# Patient Record
Sex: MALE | Race: WHITE | Employment: OTHER | URBAN - METROPOLITAN AREA
[De-identification: names, ages, dates, MRNs, and addresses within clinical notes are randomized per-mention and may not be internally consistent; named-entity substitution may affect disease eponyms.]

---

## 2017-03-19 ENCOUNTER — HOSPITAL ENCOUNTER (EMERGENCY)
Age: 42
Discharge: HOME | End: 2017-03-19
Attending: FAMILY MEDICINE

## 2017-03-19 VITALS
HEIGHT: 74 IN | HEART RATE: 76 BPM | BODY MASS INDEX: 39.78 KG/M2 | DIASTOLIC BLOOD PRESSURE: 100 MMHG | OXYGEN SATURATION: 99 % | SYSTOLIC BLOOD PRESSURE: 155 MMHG | RESPIRATION RATE: 18 BRPM | WEIGHT: 310 LBS | TEMPERATURE: 97.7 F

## 2017-03-19 DIAGNOSIS — S61.209A AVULSION OF SKIN OF FINGER, INITIAL ENCOUNTER: Primary | ICD-10-CM

## 2017-03-19 RX ORDER — SERTRALINE HYDROCHLORIDE 50 MG/1
TABLET, FILM COATED ORAL DAILY
COMMUNITY
End: 2017-12-21

## 2017-03-19 RX ORDER — HYDROCODONE BITARTRATE AND ACETAMINOPHEN 5; 325 MG/1; MG/1
1 TABLET ORAL
Qty: 10 TAB | Refills: 0 | Status: SHIPPED | OUTPATIENT
Start: 2017-03-19 | End: 2017-12-21

## 2017-03-19 NOTE — UC PROVIDER NOTE
HPI Comments: 44 yo male presents today after sustaining a skin avulsion to his left index finger with a lluvia today. Tetanus is not current. Patient is right handed. Patient is a 43 y.o. male presenting with wound check. The history is provided by the patient. No  was used. Wound Check    This is a new problem. The current episode started less than 1 hour ago. The problem occurs constantly. The problem has not changed since onset. Pain location: Left index finger. The quality of the pain is described as sharp. The pain is at a severity of 4/10. The pain is mild. Pertinent negatives include no numbness. Past Medical History:   Diagnosis Date    Cardiomyopathy [facility])         History reviewed. No pertinent surgical history. History reviewed. No pertinent family history. Social History     Social History    Marital status:      Spouse name: N/A    Number of children: N/A    Years of education: N/A     Occupational History    Not on file. Social History Main Topics    Smoking status: Never Smoker    Smokeless tobacco: Not on file    Alcohol use Yes      Comment: socially    Drug use: No    Sexual activity: Not on file     Other Topics Concern    Not on file     Social History Narrative                ALLERGIES: Review of patient's allergies indicates no known allergies. Review of Systems   Constitutional: Negative. HENT: Negative. Eyes: Negative. Respiratory: Negative. Cardiovascular: Negative. Gastrointestinal: Negative. Endocrine: Negative. Genitourinary: Negative. Musculoskeletal: Negative. Skin: Positive for wound. Skin avulsion to left index finger     Allergic/Immunologic: Negative. Neurological: Negative. Negative for numbness. Hematological: Negative.         Vitals:    03/19/17 1834   BP: (!) 155/100   Pulse: 76   Resp: 18   Temp: 97.7 °F (36.5 °C)   SpO2: 99%   Weight: 140.6 kg (310 lb)   Height: 6' 2\" (1.88 m)       Physical Exam   Constitutional: He is oriented to person, place, and time. He appears well-developed and well-nourished. HENT:   Head: Normocephalic and atraumatic. Right Ear: External ear normal.   Left Ear: External ear normal.   Nose: Nose normal.   Mouth/Throat: Oropharynx is clear and moist.   Eyes: Conjunctivae and EOM are normal. Pupils are equal, round, and reactive to light. Neck: Normal range of motion. Neck supple. Cardiovascular: Normal rate, regular rhythm and normal heart sounds. Pulmonary/Chest: Effort normal and breath sounds normal.   Musculoskeletal: Normal range of motion. He exhibits tenderness. He exhibits no deformity. Neurological: He is alert and oriented to person, place, and time. Skin: Skin is warm and dry. Left lateral index finger with superficial skin avulsion approx 3 cm x 1.5 cm  No exposed bone, tendon, or vessel  Bleeding is controlled  ROM intact  Nail intact   Psychiatric: He has a normal mood and affect. His behavior is normal. Judgment and thought content normal.   Nursing note and vitals reviewed. MDM     Differential Diagnosis; Clinical Impression; Plan:     CLINICAL IMPRESSION:  Avulsion of skin of finger, initial encounter  (primary encounter diagnosis)    Plan:  1. Superficial skin avulsion  2. Leave the current dressing on x 2 days then remove and wash with soap and water  3. Return to the clinic for any concerns  Risk of Significant Complications, Morbidity, and/or Mortality:   Presenting problems: Moderate  Diagnostic procedures:   Moderate  Progress:   Patient progress:  Stable      Procedures

## 2017-03-19 NOTE — DISCHARGE INSTRUCTIONS
Skin Tears: Care Instructions  Your Care Instructions  As we get older, our skin gets drier and more fragile. Sometimes this can cause the outer layers of skin to split and tear open. Skin tears are treated in different ways. In some cases, doctors use pieces of tape called Steri-Strips to pull the skin together and help it heal. Other times, it's best to leave the tear open and cover it with a special wound-care bandage. Skin tears are usually not serious. They usually heal in a few weeks. But how long you take to heal depends on your body and the type of tear you have. Sometimes the torn piece of skin is used to protect the wound while it heals. But that piece of skin does not heal. It may fall off on its own. Or the doctor may remove it. As your tear heals, it's important to keep it clean to help prevent infection. The doctor has checked you carefully, but problems can develop later. If you notice any problems or new symptoms, get medical treatment right away. Follow-up care is a key part of your treatment and safety. Be sure to make and go to all appointments, and call your doctor if you are having problems. It's also a good idea to know your test results and keep a list of the medicines you take. How can you care for yourself at home? · If you have pain, ask your doctor if you can take an over-the-counter pain medicine, such as acetaminophen (Tylenol), ibuprofen (Advil, Motrin), or naproxen (Aleve). Be safe with medicines. Read and follow all instructions on the label. · If you have a bandage, follow your doctor's instructions for changing it. · If you have Steri-Strips, leave them on until they fall off. · Follow your doctor's instructions about bathing. · Gently wash the skin tear with plain water 2 times a day. Do not rub the area. · Let the area air dry. Or you can pat it carefully with a soft towel. When should you call for help?   Call your doctor now or seek immediate medical care if:  · You have signs of infection, such as:  ¨ Increased pain, swelling, warmth, or redness around the tear. ¨ Red streaks leading from the tear. ¨ Pus draining from the tear. ¨ A fever. · The tear starts to bleed a lot. Small amounts of blood are normal.  Watch closely for changes in your health, and be sure to contact your doctor if:  · You do not get better as expected. Where can you learn more? Go to http://sarah-julia.info/. Enter Y027 in the search box to learn more about \"Skin Tears: Care Instructions. \"  Current as of: May 27, 2016  Content Version: 11.1  © 4302-3102 [employer]. Care instructions adapted under license by [employer] (which disclaims liability or warranty for this information). If you have questions about a medical condition or this instruction, always ask your healthcare professional. [address] any warranty or liability for your use of this information.

## 2017-06-08 ENCOUNTER — OFFICE VISIT (OUTPATIENT)
Dept: SLEEP MEDICINE | Age: 42
End: 2017-06-08

## 2017-06-08 ENCOUNTER — DOCUMENTATION ONLY (OUTPATIENT)
Dept: SLEEP MEDICINE | Age: 42
End: 2017-06-08

## 2017-06-08 VITALS
BODY MASS INDEX: 40.3 KG/M2 | SYSTOLIC BLOOD PRESSURE: 118 MMHG | OXYGEN SATURATION: 95 % | HEIGHT: 74 IN | HEART RATE: 87 BPM | DIASTOLIC BLOOD PRESSURE: 76 MMHG | WEIGHT: 314 LBS

## 2017-06-08 DIAGNOSIS — I10 ESSENTIAL HYPERTENSION: ICD-10-CM

## 2017-06-08 DIAGNOSIS — G47.33 OBSTRUCTIVE SLEEP APNEA (ADULT) (PEDIATRIC): Primary | ICD-10-CM

## 2017-06-08 DIAGNOSIS — I50.22 CHRONIC SYSTOLIC HEART FAILURE (HCC): ICD-10-CM

## 2017-06-08 PROBLEM — G47.30 SLEEP APNEA: Status: ACTIVE | Noted: 2017-06-08

## 2017-06-08 PROBLEM — I50.9 HEART FAILURE (HCC): Status: ACTIVE | Noted: 2017-06-08

## 2017-06-08 RX ORDER — LISINOPRIL 10 MG/1
TABLET ORAL DAILY
COMMUNITY
End: 2017-12-21

## 2017-06-08 RX ORDER — METOPROLOL TARTRATE 25 MG/1
TABLET, FILM COATED ORAL 2 TIMES DAILY
COMMUNITY

## 2017-06-08 RX ORDER — ASPIRIN 81 MG/1
TABLET ORAL DAILY
COMMUNITY

## 2017-06-08 RX ORDER — SERTRALINE HYDROCHLORIDE 50 MG/1
TABLET, FILM COATED ORAL DAILY
COMMUNITY

## 2017-06-08 NOTE — PROGRESS NOTES
[address] [facility] [place]. [place], [address]  Tel.  [phone]  Fax. 100 [facility] [address], [address] [facility]  Tel.  [phone]  Fax. [phone] [address]  Tel.  [phone]  Fax. [phone]       Subjective:     North Billy is a 43 y.o. male self-referred for evaluation and management of sleep apnea. He was diagnosed with sleep apnea 6 years ago. He is using his device regularly. He complains of snoring, snorting, periods of not breathing, tossing and turning, kicking, excessive daytime sleepiness associated with despite using his CPAP he is having pauses in breathing. He doesn't think it is working anymore. .  Symptoms began 3 years ago, gradually worsening since that time. He usually can fall asleep in 10-15 minutes. Family or house members note snoring, periods of not breathing. He denies falling asleep while driving. There are moderate  mask comfort problems. The following problems are noted with PAP:     Drowsiness yes Problems exhaling no   Snoring yes Forget to put on no   Mask Comfortable no Can't fall asleep no   Dry Mouth no Mask falls off no   Air Leaking yes Frequent awakenings yes     North Billy does wake up frequently at night. He is not bothered by waking up too early and left unable to get back to sleep. He actually sleeps about 5 hours at night and wakes up about 3 times during the night. He   work shifts:  . Thelma Tim indicates he does get too little sleep at night. His bedtime is 0000. He awakens at 0700. He does take naps. He takes 4 naps a week lasting 2. He has the following observed behaviors: Loud snoring, Sleep talking, Pauses in breathing, Twitching of legs or feet, Grinding teeth, Kicking with legs, Becoming very rigid and/or shaking; Nightmares. Other remarks: waking with gasp or snort; vivid dreams    Hansen Sleepiness Score: 13   which reflect mild daytime drowsiness.     No Known Allergies      Current Outpatient Prescriptions:     metoprolol tartrate (LOPRESSOR) 25 mg tablet, Take  by mouth two (2) times a day., Disp: , Rfl:     lisinopril (PRINIVIL, ZESTRIL) 10 mg tablet, Take  by mouth daily. , Disp: , Rfl:     sertraline (ZOLOFT) 50 mg tablet, Take  by mouth daily. , Disp: , Rfl:     aspirin delayed-release 81 mg tablet, Take  by mouth daily. , Disp: , Rfl:      He  has a past medical history of Heart failure ([facility] [address] Hypertension; Morbid obesity ([facility] Psychiatric disorder; and Sleep apnea. He  has a past surgical history that includes orthopaedic. He family history includes Depression in his father; Hypertension in his father; Stroke in his paternal grandfather. He  reports that he has quit smoking. He does not have any smokeless tobacco history on file. He reports that he drinks alcohol. He reports that he does not use illicit drugs. Review of Systems:  Constitutional:  40 pound  weight gain. Eyes:  No blurred vision. CVS:  No significant chest pain, treated for intrinsic cardiomyopathy  Pulm:  No significant shortness of breath  GI:  No significant nausea or vomiting  :  No significant nocturia  Musculoskeletal:  No significant joint pain at night  Skin:  No significant rashes  Neuro:  No significant dizziness   Psych: Anxiety controlled    Sleep Review of Systems: notable for no difficulty falling asleep; +frequent awakenings at night;  regular dreaming noted; no nightmares ; no early morning headaches ; no memory problems; no concentration issues; no history of any automobile or occupational accidents due to daytime drowsiness.       Objective:     Visit Vitals    /76    Pulse 87    Ht 6' 2\" (1.88 m)    Wt 314 lb (142.4 kg)    SpO2 95%    BMI 40.32 kg/m2         General:   Not in acute distress   Eyes:  Anicteric sclerae, no obvious strabismus   Nose:  No obvious nasal septum deviation    Oropharynx:   Class 3 oropharyngeal outlet, thick tongue base, enlarged and boggy uvula, low-lying soft palate, narrow tonsilo-pharyngeal pilars   Tonsils:   tonsils are present and normal   Neck:   Neck circ. in \"inches\": 18.75; midline trachea   Chest/Lungs:  Equal lung expansion, clear on auscultation    CVS:  Normal rate, regular rhythm; no JVD   Skin:  Warm to touch; no obvious rashes   Neuro:  No focal deficits ; no obvious tremor    Psych:  Normal affect,  normal countenance;          Assessment:       ICD-10-CM ICD-9-CM    1. Obstructive sleep apnea (adult) (pediatric) G47.33 327.23 SPLIT CPAP/PSG   2. Chronic systolic heart failure (HCC) I50.22 428.22 SPLIT CPAP/PSG   3. Essential hypertension I10 401.9        Plan:     Orders Placed This Encounter    [id number] POLYSOMNOGRAPHY SPLIT STUDY    metoprolol tartrate (LOPRESSOR) 25 mg tablet    lisinopril (PRINIVIL, ZESTRIL) 10 mg tablet    sertraline (ZOLOFT) 50 mg tablet    aspirin delayed-release 81 mg tablet     Despite using his CPAP, he is having apneas and EDS. We are requesting previous records. I have ordered a split night study. I will call him with the results. I have counseled the patient regarding the benefits of weight loss. Treatment options for sleep apnea were reviewed. He will need a replacement device. 2. Heart failure - he continues on his current regimen and will follow with his cardiologist at next scheduled visit. n.  I have reviewed the relationship between heart failure  and sleep disordered breathing.     Joe Vivas MD  Diplomate in Sleep Medicine  [facility]

## 2017-06-08 NOTE — PATIENT INSTRUCTIONS
[address] [facility] [place] [place], [address]  Tel.  [phone]  Fax. 100 [facility] 60  [place], [address] [facility]  Tel.  [phone]  Fax. [phone] [address] [place]  Tel.  [phone]  Fax. [phone]     Sleep Apnea: After Your Visit  Your Care Instructions  Sleep apnea occurs when you frequently stop breathing for 10 seconds or longer during sleep. It can be mild to severe, based on the number of times per hour that you stop breathing or have slowed breathing. Blocked or narrowed airways in your nose, mouth, or throat can cause sleep apnea. Your airway can become blocked when your throat muscles and tongue relax during sleep. Sleep apnea is common, occurring in 1 out of 20 individuals. Individuals having any of the following characteristics should be evaluated and treated right away due to high risk and detrimental consequences from untreated sleep apnea:  1. Obesity  2. Congestive Heart failure  3. Atrial Fibrillation  4. Uncontrolled Hypertension  5. Type II Diabetes  6. Night-time Arrhythmias  7. Stroke  8. Pulmonary Hypertension  9. High-risk Driving Populations (pilots, truck drivers, etc.)  10. Patients Considering Weight-loss Surgery    How do you know you have sleep apnea? You probably have sleep apnea if you answer 'yes' to 3 or more of the following questions:  S - Have you been told that you Snore? T - Are you often Tired during the day? O - Has anyone Observed you stop breathing while sleeping? P- Do you have (or are being treated for) high blood Pressure? B - Are you obese (Body Mass Index > 35)? A - Is your Age 48years old or older? N - Is your Neck size greater than 16 inches? G - Are you male Gender? A sleep physician can prescribe a breathing device that prevents tissues in the throat from blocking your airway.  Or your doctor may recommend using a dental device (oral breathing device) to help keep your airway open. In some cases, surgery may be needed to remove enlarged tissues in the throat. Follow-up care is a key part of your treatment and safety. Be sure to make and go to all appointments, and call your doctor if you are having problems. It's also a good idea to know your test results and keep a list of the medicines you take. How can you care for yourself at home? · Lose weight, if needed. It may reduce the number of times you stop breathing or have slowed breathing. · Go to bed at the same time every night. · Sleep on your side. It may stop mild apnea. If you tend to roll onto your back, sew a pocket in the back of your pajama top. Put a tennis ball into the pocket, and stitch the pocket shut. This will help keep you from sleeping on your back. · Avoid alcohol and medicines such as sleeping pills and sedatives before bed. · Do not smoke. Smoking can make sleep apnea worse. If you need help quitting, talk to your doctor about stop-smoking programs and medicines. These can increase your chances of quitting for good. · Prop up the head of your bed 4 to 6 inches by putting bricks under the legs of the bed. · Treat breathing problems, such as a stuffy nose, caused by a cold or allergies. · Use a continuous positive airway pressure (CPAP) breathing machine if lifestyle changes do not help your apnea and your doctor recommends it. The machine keeps your airway from closing when you sleep. · If CPAP does not help you, ask your doctor whether you should try other breathing machines. A bilevel positive airway pressure machine has two types of air pressureâone for breathing in and one for breathing out. Another device raises or lowers air pressure as needed while you breathe. · If your nose feels dry or bleeds when using one of these machines, talk with your doctor about increasing moisture in the air. A humidifier may help.   · If your nose is runny or stuffy from using a breathing machine, talk with your doctor about using decongestants or a corticosteroid nasal spray. When should you call for help? Watch closely for changes in your health, and be sure to contact your doctor if:  · You still have sleep apnea even though you have made lifestyle changes. · You are thinking of trying a device such as CPAP. · You are having problems using a CPAP or similar machine. Where can you learn more? Go to [employer]. Enter I267 in the search box to learn more about \"Sleep Apnea: After Your Visit. \"   © 4389-3505 [employer]wise, [facility]. Care instructions adapted under license by Sherrie Siddiqi (which disclaims liability or warranty for this information). This care instruction is for use with your licensed healthcare professional. If you have questions about a medical condition or this instruction, always ask your healthcare professional. Zhen Giron any warranty or liability for your use of this information. PROPER SLEEP HYGIENE    What to avoid  · Do not have drinks with caffeine, such as coffee or black tea, for 8 hours before bed. · Do not smoke or use other types of tobacco near bedtime. Nicotine is a stimulant and can keep you awake. · Avoid drinking alcohol late in the evening, because it can cause you to wake in the middle of the night. · Do not eat a big meal close to bedtime. If you are hungry, eat a light snack. · Do not drink a lot of water close to bedtime, because the need to urinate may wake you up during the night. · Do not read or watch TV in bed. Use the bed only for sleeping and sexual activity. What to try  · Go to bed at the same time every night, and wake up at the same time every morning. Do not take naps during the day. · Keep your bedroom quiet, dark, and cool. · Get regular exercise, but not within 3 to 4 hours of your bedtime. .  · Sleep on a comfortable pillow and mattress.   · If watching the clock makes you anxious, turn it facing away from you so you cannot see the time. · If you worry when you lie down, start a worry book. Well before bedtime, write down your worries, and then set the book and your concerns aside. · Try meditation or other relaxation techniques before you go to bed. · If you cannot fall asleep, get up and go to another room until you feel sleepy. Do something relaxing. Repeat your bedtime routine before you go to bed again. · Make your house quiet and calm about an hour before bedtime. Turn down the lights, turn off the TV, log off the computer, and turn down the volume on music. This can help you relax after a busy day. Drowsy Driving  The [address] [place] [address] Road Traffic Safety Administration cites drowsiness as a causing factor in more than 118,156 police reported crashes annually, resulting in 76,000 injuries and 1,500 deaths. Other surveys suggest 55% of people polled have driven while drowsy in the past year, 23% had fallen asleep but not crashed, 3% crashed, and 2% had and accident due to drowsy driving. Who is at risk? Young Drivers: One study of drowsy driving accidents states that 55% of the drivers were under 25 years. Of those, 75% were male. Shift Workers and Travelers: People who work overnight or travel across time zones frequently are at higher risk of experiencing Circadian Rhythm Disorders. They are trying to work and function when their body is programed to sleep. Sleep Deprived: Lack of sleep has a serious impact on your ability to pay attention or focus on a task. Consistently getting less than the average of 8 hours your body needs creates partial or cumulative sleep deprivation. Untreated Sleep Disorders: Sleep Apnea, Narcolepsy, R.L.S., and other sleep disorders (untreated) prevent a person from getting enough restful sleep. This leads to excessive daytime sleepiness and increases the risk for drowsy driving accidents by up to 7 times.   Medications / Alcohol: Even over the counter medications can cause drowsiness. Medications that impair a drivers attention should have a warning label. Alcohol naturally makes you sleepy and on its own can cause accidents. Combined with excessive drowsiness its effects are amplified. Signs of Drowsy Driving:   * You don't remember driving the last few miles   * You may drift out of your lisa   * You are unable to focus and your thoughts wander   * You may yawn more often than normal   * You have difficulty keeping your eyes open / nodding off   * Missing traffic signs, speeding, or tailgating  Prevention-   Good sleep hygiene, lifestyle and behavioral choices have the most impact on drowsy driving. There is no substitute for sleep and the average person requires 8 hours nightly. If you find yourself driving drowsy, stop and sleep. Consider the sleep hygiene tips provided during your visit as well. Medication Refill Policy: Refills for all medications require 1 week advance notice. Please have your pharmacy fax a refill request. We are unable to fax, or call in \"controled substance\" medications and you will need to pick these prescriptions up from our office. [employer] Activation    Thank you for requesting access to [employer]. Please follow the instructions below to securely access and download your online medical record. [employer] allows you to send messages to your doctor, view your test results, renew your prescriptions, schedule appointments, and more. How Do I Sign Up? 1. In your internet browser, go to [email or web address]. 2. Click on the First Time User? Click Here link in the Sign In box. You will see the New Member Sign Up page. 3. Enter your [employer] Access Code exactly as it appears below. You will not need to use this code after youve completed the sign-up process. If you do not sign up before the expiration date, you must request a new code.     Love[employer] Access Code: [email or web address]  Expires: 9/6/2017  9:43 AM (This is the date your [employer] access code will )    4. Enter the last four digits of your Social Security Number (xxxx) and Date of Birth (mm/dd/yyyy) as indicated and click Submit. You will be taken to the next sign-up page. 5. Create a [employer] ID. This will be your Bi[employer] login ID and cannot be changed, so think of one that is secure and easy to remember. 6. Create a [employer] password. You can change your password at any time. 7. Enter your Password Reset Question and Answer. This can be used at a later time if you forget your password. 8. Enter your e-mail address. You will receive e-mail notification when new information is available in [address] 9. Click Sign Up. You can now view and download portions of your medical record. 10. Click the Download Summary menu link to download a portable copy of your medical information. Additional Information    If you have questions, please call [phone]. Remember, BiBCOM is NOT to be used for urgent needs. For medical emergencies, dial 911.

## 2017-07-31 ENCOUNTER — HOSPITAL ENCOUNTER (OUTPATIENT)
Dept: SLEEP MEDICINE | Age: 42
Discharge: HOME | End: 2017-07-31
Payer: COMMERCIAL

## 2017-07-31 VITALS
DIASTOLIC BLOOD PRESSURE: 83 MMHG | WEIGHT: 314.8 LBS | TEMPERATURE: 99 F | SYSTOLIC BLOOD PRESSURE: 121 MMHG | BODY MASS INDEX: 41.72 KG/M2 | HEIGHT: 73 IN

## 2017-07-31 DIAGNOSIS — I50.22 CHRONIC SYSTOLIC HEART FAILURE (HCC): ICD-10-CM

## 2017-07-31 DIAGNOSIS — G47.33 OBSTRUCTIVE SLEEP APNEA (ADULT) (PEDIATRIC): ICD-10-CM

## 2017-07-31 PROCEDURE — 95810 POLYSOM 6/> YRS 4/> PARAM: CPT

## 2017-08-03 ENCOUNTER — TELEPHONE (OUTPATIENT)
Dept: SLEEP MEDICINE | Age: 42
End: 2017-08-03

## 2017-08-03 ENCOUNTER — DOCUMENTATION ONLY (OUTPATIENT)
Dept: SLEEP MEDICINE | Age: 42
End: 2017-08-03

## 2017-08-03 DIAGNOSIS — G47.33 OBSTRUCTIVE SLEEP APNEA (ADULT) (PEDIATRIC): Primary | ICD-10-CM

## 2017-08-03 NOTE — TELEPHONE ENCOUNTER
Polysomnogram was performed and the results of the study were explained to the patient. Please refer to interpretation report for further details. Apnea/Hypopnea index of 9 which indicates significant apnea. He continues to have snoring, snorting, excessive daytime sleepiness. Treatment options were reviewed in detail. he would like to proceed with PAP therapy. He does not know the setting to his current machine, but does not feel that it is very high. I have placed an order for APAP. he will be seen in follow-up in 6 weeks to gauge treatment response and adherence to therapy. All of his questions were addressed. Order PAP and call patient and let them know which [employer] company they should be hearing from. Schedule for first adherence visit in 6 weeks.

## 2017-12-21 ENCOUNTER — HOSPITAL ENCOUNTER (EMERGENCY)
Age: 42
Discharge: HOME | End: 2017-12-21
Attending: FAMILY MEDICINE

## 2017-12-21 ENCOUNTER — APPOINTMENT (OUTPATIENT)
Dept: GENERAL RADIOLOGY | Age: 42
End: 2017-12-21
Attending: FAMILY MEDICINE

## 2017-12-21 VITALS
SYSTOLIC BLOOD PRESSURE: 134 MMHG | BODY MASS INDEX: 40.43 KG/M2 | RESPIRATION RATE: 16 BRPM | TEMPERATURE: 97.8 F | WEIGHT: 315 LBS | HEART RATE: 78 BPM | HEIGHT: 74 IN | DIASTOLIC BLOOD PRESSURE: 90 MMHG | OXYGEN SATURATION: 98 %

## 2017-12-21 DIAGNOSIS — S60.459A FOREIGN BODY OF FINGER OF RIGHT HAND, INITIAL ENCOUNTER: Primary | ICD-10-CM

## 2017-12-21 RX ORDER — CEPHALEXIN 500 MG/1
500 CAPSULE ORAL 3 TIMES DAILY
Qty: 30 CAP | Refills: 0 | Status: SHIPPED | OUTPATIENT
Start: 2017-12-21 | End: 2017-12-31

## 2017-12-21 NOTE — DISCHARGE INSTRUCTIONS
Object in the Skin: Care Instructions  Your Care Instructions  Small objects (splinters) of wood, metal, glass, or plastic can become embedded in the skin. Thorns from [employer] and other plants also can prick or become stuck in the skin. Splinters can cause an infection if they are not removed. Your doctor probably removed the object and cleaned the skin well. Your doctor may have given you antibiotics to prevent infection and a tetanus shot if you had not had one in the last 5 years or do not know when you had your last one. For a few days, you may have pain and itching in the wound where the object was removed. Follow-up care is a key part of your treatment and safety. Be sure to make and go to all appointments, and call your doctor if you are having problems. It's also a good idea to know your test results and keep a list of the medicines you take. How can you care for yourself at home? · If your doctor told you how to care for your wound, follow your doctor's instructions. If you did not get instructions, follow this general advice:  ¨ Wash the wound with clean water 2 times a day. Don't use hydrogen peroxide or alcohol, which can slow healing. ¨ You may cover the wound with a thin layer of petroleum jelly, such as Vaseline, and a nonstick bandage. ¨ Apply more petroleum jelly and replace the bandage as needed. · Your doctor may have used medicine to numb your skin. When it wears off, your pain may return. Take an over-the-counter pain medicine, such as acetaminophen (Tylenol), ibuprofen (Advil, Motrin), or naproxen (Aleve). Read and follow all instructions on the label. · Do not take two or more pain medicines at the same time unless the doctor told you to. Many pain medicines have acetaminophen, which is Tylenol. Too much acetaminophen (Tylenol) can be harmful. · If your doctor prescribed antibiotics, take them as directed. Do not stop taking them just because you feel better.  You need to take the full course of antibiotics. · After 2 or 3 days, if your swelling is gone, apply a heating pad set on low or a warm cloth to your wound area. Some doctors suggest that you go back and forth between hot and cold. Put a thin cloth between the heating pad and your skin. · It may help to prop up the affected part of your body on a pillow anytime you sit or lie down during the next 3 days. Try to keep it above the level of your heart. This will help reduce swelling. · Your wound may itch or feel irritated. A little redness and swelling is normal. Do not scratch or rub the wound. When should you call for help? Call your doctor now or seek immediate medical care if:  ? · The skin near the wound is cool or pale or changes color. ? · You have tingling, weakness, or numbness in the area near the wound. ? · The wound starts to bleed, and blood soaks the bandage. Oozing small amounts of blood is normal.   ? · You have trouble moving a limb near the wound. ? · You have signs of infection, such as:  ¨ Increased pain, swelling, warmth, or redness. ¨ Red streaks leading from the wound. ¨ Pus draining from the wound. ¨ A fever. ? Watch closely for changes in your health, and be sure to contact your doctor if:  ? · You do not get better as expected. Where can you learn more? Go to http://sarah-julia.info/. Enter Tari Rosenberg in the search box to learn more about \"Object in the Skin: Care Instructions. \"  Current as of: March 20, 2017  Content Version: 11.4  © 0837-2005 [employer]. Care instructions adapted under license by [employer] (which disclaims liability or warranty for this information). If you have questions about a medical condition or this instruction, always ask your healthcare professional. [address] any warranty or liability for your use of this information.

## 2017-12-21 NOTE — UC PROVIDER NOTE
HPI Comments: Maude Hagan presents with foreign body in right 4th finger. Reports got large splinter in finger when sawing wood, that went through and through. Tetanus up to date. The history is provided by the patient. Past Medical History:   Diagnosis Date    Cardiomyopathy ([address]     Heart failure ([address]     Hypertension     Morbid obesity ([address]     Psychiatric disorder     depression and anxiety    Sleep apnea         Past Surgical History:   Procedure Laterality Date    HX ORTHOPAEDIC      wrist repair         Family History   Problem Relation Age of Onset    Hypertension Father     Depression Father     Stroke Paternal Grandfather         Social History     Social History    Marital status:      Spouse name: N/A    Number of children: N/A    Years of education: N/A     Occupational History    Not on file. Social History Main Topics    Smoking status: Former Smoker    Smokeless tobacco: Not on file    Alcohol use Yes      Comment: social    Drug use: No    Sexual activity: Not on file     Other Topics Concern    Not on file     Social History Narrative    ** Merged History Encounter **                     ALLERGIES: Review of patient's allergies indicates no known allergies. Review of Systems   Musculoskeletal: Negative for myalgias. Skin: Positive for wound. Neurological: Negative for weakness and numbness. Vitals:    12/21/17 1733   BP: 134/90   Pulse: 78   Resp: 16   Temp: 97.8 °F (36.6 °C)   SpO2: 98%   Weight: 142.9 kg (315 lb)   Height: 6' 2\" (1.88 m)       Physical Exam   Constitutional: He appears well-developed and well-nourished. No distress. Musculoskeletal:   Right 4th digit, distal phalanx: splinter through radial and ulnar aspect - palmar region   Neurological: He is alert. Skin: He is not diaphoretic. Psychiatric: He has a normal mood and affect.  His behavior is normal. Judgment and thought content normal.   Nursing note and vitals reviewed. ROCAEL     Differential Diagnosis; Clinical Impression; Plan:     CLINICAL IMPRESSION:  Foreign body of finger of right hand, initial encounter  (primary encounter diagnosis)    Plan:  1. Splinter removed  2. keflex  3. F/u with pcp  Risk of Significant Complications, Morbidity, and/or Mortality:   Presenting problems: Moderate  Management options: Moderate  Progress:   Patient progress:  Stable      Foreign Body Removal  Date/Time: 12/21/2017 6:23 PM  Performed by: Dia Smith  Authorized by: Dia Smith     Consent:     Consent obtained:  Verbal  Location:     Location:  Finger    Finger location:  R ring finger    Depth: Intramuscular    Tendon involvement:  None  Pre-procedure details:     Imaging:  X-ray    Preparation: Patient was prepped and draped in usual sterile fashion    Anesthesia (see MAR for exact dosages): Anesthesia method:  Local infiltration    Local anesthetic:  Lidocaine 1% w/o epi  Procedure type:     Procedure complexity:  Simple  Procedure details:     Dissection of underlying tissues: no      Bloodless field: yes      Removal mechanism: Forceps    Foreign bodies recovered:  1    Description:  Splinter - 4cm long    Intact foreign body removal: yes    Post-procedure details:     Neurovascular status: intact      Confirmation:  No additional foreign bodies on visualization    Skin closure:  None    Dressing:  Tube gauze    Patient tolerance of procedure:   Tolerated well, no immediate complications

## 2020-06-23 ENCOUNTER — DOCUMENTATION ONLY (OUTPATIENT)
Dept: SLEEP MEDICINE | Age: 45
End: 2020-06-23

## 2020-06-23 ENCOUNTER — VIRTUAL VISIT (OUTPATIENT)
Dept: SLEEP MEDICINE | Age: 45
End: 2020-06-23

## 2020-06-23 DIAGNOSIS — I10 ESSENTIAL HYPERTENSION: ICD-10-CM

## 2020-06-23 DIAGNOSIS — G47.33 OBSTRUCTIVE SLEEP APNEA (ADULT) (PEDIATRIC): Primary | ICD-10-CM

## 2020-06-23 NOTE — PATIENT INSTRUCTIONS
[address] [facility] [place]. [address] [facility], [address] Tel.  [phone] Fax. 100 [facility] [address] [place], [address] [facility] [address] Tel.  [phone] Fax. [phone] [address] Tel.  [phone] Fax. [phone] PROPER SLEEP HYGIENE What to avoid · Do not have drinks with caffeine, such as coffee or black tea, for 8 hours before bed. · Do not smoke or use other types of tobacco near bedtime. Nicotine is a stimulant and can keep you awake. · Avoid drinking alcohol late in the evening, because it can cause you to wake in the middle of the night. · Do not eat a big meal close to bedtime. If you are hungry, eat a light snack. · Do not drink a lot of water close to bedtime, because the need to urinate may wake you up during the night. · Do not read or watch TV in bed. Use the bed only for sleeping and sexual activity. What to try · Go to bed at the same time every night, and wake up at the same time every morning. Do not take naps during the day. · Keep your bedroom quiet, dark, and cool. · Get regular exercise, but not within 3 to 4 hours of your bedtime. Taran Duarte · Sleep on a comfortable pillow and mattress. · If watching the clock makes you anxious, turn it facing away from you so you cannot see the time. · If you worry when you lie down, start a worry book. Well before bedtime, write down your worries, and then set the book and your concerns aside. · Try meditation or other relaxation techniques before you go to bed. · If you cannot fall asleep, get up and go to another room until you feel sleepy. Do something relaxing. Repeat your bedtime routine before you go to bed again. · Make your house quiet and calm about an hour before bedtime. Turn down the lights, turn off the TV, log off the computer, and turn down the volume on music. This can help you relax after a busy day. Drowsy Driving The [facility] cites drowsiness as a causing factor in more than 077,415 police reported crashes annually, resulting in 76,000 injuries and 1,500 deaths. Other surveys suggest 55% of people polled have driven while drowsy in the past year, 23% had fallen asleep but not crashed, 3% crashed, and 2% had and accident due to drowsy driving. Who is at risk? Young Drivers: One study of drowsy driving accidents states that 55% of the drivers were under 25 years. Of those, 75% were male. Shift Workers and Travelers: People who work overnight or travel across time zones frequently are at higher risk of experiencing Circadian Rhythm Disorders. They are trying to work and function when their body is programed to sleep. Sleep Deprived: Lack of sleep has a serious impact on your ability to pay attention or focus on a task. Consistently getting less than the average of 8 hours your body needs creates partial or cumulative sleep deprivation. Untreated Sleep Disorders: Sleep Apnea, Narcolepsy, R.L.S., and other sleep disorders (untreated) prevent a person from getting enough restful sleep. This leads to excessive daytime sleepiness and increases the risk for drowsy driving accidents by up to 7 times. Medications / Alcohol: Even over the counter medications can cause drowsiness. Medications that impair a drivers attention should have a warning label. Alcohol naturally makes you sleepy and on its own can cause accidents. Combined with excessive drowsiness its effects are amplified. Signs of Drowsy Driving: * You don't remember driving the last few miles * You may drift out of your lisa * You are unable to focus and your thoughts wander * You may yawn more often than normal 
 * You have difficulty keeping your eyes open / nodding off * Missing traffic signs, speeding, or tailgating Prevention-  
Good sleep hygiene, lifestyle and behavioral choices have the most impact on drowsy driving. There is no substitute for sleep and the average person requires 8 hours nightly. If you find yourself driving drowsy, stop and sleep. Consider the sleep hygiene tips provided during your visit as well. Medication Refill Policy: Refills for all medications require 1 week advance notice. Please have your pharmacy fax a refill request. We are unable to fax, or call in \"controled substance\" medications and you will need to pick these prescriptions up from our office. [employer] Activation Thank you for requesting access to [employer]. Please follow the instructions below to securely access and download your online medical record. [employer] allows you to send messages to your doctor, view your test results, renew your prescriptions, schedule appointments, and more. How Do I Sign Up? 1. In your internet browser, go to [email or web address]. 2. Click on the First Time User? Click Here link in the Sign In box. You will see the New Member Sign Up page. 3. Enter your [employer] Access Code exactly as it appears below. You will not need to use this code after youve completed the sign-up process. If you do not sign up before the expiration date, you must request a new code. [employer] Access Code: Activation code not generated Current [employer] Status: Active (This is the date your [employer] access code will ) 4. Enter the last four digits of your Social Security Number (xxxx) and Date of Birth (mm/dd/yyyy) as indicated and click Submit. You will be taken to the next sign-up page. 5. Create a [employer] ID. This will be your [employer] login ID and cannot be changed, so think of one that is secure and easy to remember. 6. Create a [employer] password. You can change your password at any time. 7. Enter your Password Reset Question and Answer. This can be used at a later time if you forget your password. 8. Enter your e-mail address.  You will receive e-mail notification when new information is available in [employer]. 9. Click Sign Up. You can now view and download portions of your medical record. 10. Click the Download Summary menu link to download a portable copy of your medical information. Additional Information If you have questions, please call [phone]. Remember, [employer] is NOT to be used for urgent needs. For medical emergencies, dial 911.

## 2020-06-23 NOTE — PROGRESS NOTES
[address] [facility] [place]. [place], [address]  Tel.  [phone]  Fax. 100 [facility] 60  [place], [address] [facility]  Tel.  [phone]  Fax. [phone] [address] [facility] [address]   Tel.  [phone]  Fax. [phone]       Telemedicine visit performed with verbal consent of the patient. Patient called and identity confirmed with 2 patient identifers    Patient was seen in his parked truck  Chaitanya Cabrera is a 39 y.o. male who was seen by synchronous (real-time) audio-video technology on 6/23/2020. Consent:  He and/or his healthcare decision maker is aware that this patient-initiated Telehealth encounter is the equivalent to a face to face encounter in the sleep disorder center and has provided verbal consent to proceed: Yes    I was at home while conducting this encounter. S>Marcial Silverio is a 39 y.o. male seen at this telemedicine visit for a positive airway pressure follow-up. He reports some problems using the device. He is 27% compliant over the past 30 days. The following problems are identified:    Drowsiness no Problems exhaling no   Snoring YES, and not getting enough air at beginning to sleep on his back Forget to put on yes   Mask Comfortable yes  Can't fall asleep no   Dry Mouth no Mask falls off no   Air Leaking Yes, waking up wife who then wakes him up Frequent awakenings yes       Download reviewed. He admits that his sleep has improved on PAP therapy using nasal pillow mask and heated tubing. No Known Allergies    He has a current medication list which includes the following prescription(s): metoprolol tartrate, sertraline, aspirin delayed-release, and lisinopril. Chele Ra He  has a past medical history of Cardiomyopathy ([address] Heart failure ([address] Hypertension, Morbid obesity ([address] Psychiatric disorder, and Sleep apnea.         O>      Weight 295 lb  Vital Signs: (As obtained by patient/caregiver at home)        Constitutional: [x] Appears well-developed and well-nourished [x] No apparent distress      [] Abnormal -     Mental status: [x] Alert and awake  [x] Oriented to person/place/time [x] Able to follow commands    [] Abnormal -     Eyes:   EOM    [x]  Normal    [] Abnormal -   Sclera  [x]  Normal    [] Abnormal -          Discharge [x]  None visible   [] Abnormal -     HENT: [x] Normocephalic, atraumatic  [] Abnormal -     External Ears [x] Normal  [] Abnormal -    Neck: [x] No visualized mass [] Abnormal -     Pulmonary/Chest: [x] Respiratory effort normal   [x] No visualized signs of difficulty breathing or respiratory distress        [] Abnormal -       Neurological:        [x] No Facial Asymmetry (Cranial nerve 7 motor function) (limited exam due to video visit)          [x] No gaze palsy        [] Abnormal -          Skin:        [x] No significant exanthematous lesions or discoloration noted on facial skin         [] Abnormal -            Psychiatric:       [x] Normal Affect [] Abnormal -        Other pertinent observable physical exam findings:-            A>    ICD-10-CM ICD-9-CM    1. Obstructive sleep apnea (adult) (pediatric) G47.33 327.23 AMB SUPPLY ORDER   2. Essential hypertension I10 401.9      AHI = 9 (2017). On CPAP :  6-13 cmH2O. Compliant:      no    Therapeutic Response:  Positive    P>    he is compliant with PAP therapy and PAP continues to benefit patient and remains necessary for control of his sleep apnea. CPAP setting - change setting to 8-13 cmH20 - changes made electronically    * We have recommended a dedicated weight loss through appropriate diet and an exercise regimen as significant weight reduction has been shown to reduce severity of obstructive sleep apnea. *   Follow-up and Dispositions    · Return in about 6 months (around 12/23/2020).       I have ordered replacement supplies      * He was asked to contact our office for any problems regarding PAP therapy. * Counseling was provided regarding the importance of regular PAP use and on proper sleep hygiene and safe driving. * Re-enforced proper and regular cleaning for the device. 2. Hypertension - he continues on his current regimen. I have reviewed the relationship between hypertension as it relates to sleep-disordered breathing. All of his questions were addressed. Pursuant to the emergency declaration under the [address] [place] [address] waiver authority and the [employer] Act, this Virtual  Visit was conducted, with patient's consent, to reduce the patient's risk of exposure to COVID-19 and provide continuity of care for an established patient. Services were provided through a video synchronous discussion virtually to substitute for in-person clinic visit.     Rahul Knox MD    Electronically signed by    Deonte Alcantar MD  Diplomate in Sleep Medicine  [facility]

## 2020-06-23 NOTE — PROGRESS NOTES
Faxed order for supplies, address and insurance updated for patient, and scheduled 6 month follow up visit.

## 2022-03-19 PROBLEM — I50.9 HEART FAILURE (HCC): Status: ACTIVE | Noted: 2017-06-08

## 2022-03-19 PROBLEM — G47.30 SLEEP APNEA: Status: ACTIVE | Noted: 2017-06-08

## 2022-03-19 PROBLEM — I10 HYPERTENSION: Status: ACTIVE | Noted: 2017-06-08

## 2022-12-18 ENCOUNTER — APPOINTMENT (OUTPATIENT)
Dept: MRI IMAGING | Age: 47
End: 2022-12-18
Attending: EMERGENCY MEDICINE
Payer: COMMERCIAL

## 2022-12-18 ENCOUNTER — HOSPITAL ENCOUNTER (OUTPATIENT)
Age: 47
Setting detail: OBSERVATION
Discharge: HOME | End: 2022-12-19
Attending: EMERGENCY MEDICINE | Admitting: HOSPITALIST
Payer: COMMERCIAL

## 2022-12-18 ENCOUNTER — APPOINTMENT (OUTPATIENT)
Dept: CT IMAGING | Age: 47
End: 2022-12-18
Attending: EMERGENCY MEDICINE
Payer: COMMERCIAL

## 2022-12-18 DIAGNOSIS — I63.9 CEREBROVASCULAR ACCIDENT (CVA), UNSPECIFIED MECHANISM (HCC): Primary | ICD-10-CM

## 2022-12-18 DIAGNOSIS — R20.0 LEFT ARM NUMBNESS: ICD-10-CM

## 2022-12-18 LAB
ALBUMIN SERPL-MCNC: 4.1 G/DL (ref 3.5–5)
ALBUMIN/GLOB SERPL: 1.2 {RATIO} (ref 1.1–2.2)
ALP SERPL-CCNC: 65 U/L (ref 45–117)
ALT SERPL-CCNC: 70 U/L (ref 12–78)
AMPHET UR QL SCN: NEGATIVE
ANION GAP SERPL CALC-SCNC: 4 MMOL/L (ref 5–15)
AST SERPL-CCNC: 25 U/L (ref 15–37)
BARBITURATES UR QL SCN: NEGATIVE
BASOPHILS # BLD: 0.1 K/UL (ref 0–0.1)
BASOPHILS NFR BLD: 1 % (ref 0–1)
BENZODIAZ UR QL: NEGATIVE
BILIRUB SERPL-MCNC: 0.6 MG/DL (ref 0.2–1)
BUN SERPL-MCNC: 17 MG/DL (ref 6–20)
BUN/CREAT SERPL: 14 (ref 12–20)
CALCIUM SERPL-MCNC: 9.4 MG/DL (ref 8.5–10.1)
CANNABINOIDS UR QL SCN: NEGATIVE
CHLORIDE SERPL-SCNC: 105 MMOL/L (ref 97–108)
CO2 SERPL-SCNC: 32 MMOL/L (ref 21–32)
COCAINE UR QL SCN: NEGATIVE
COMMENT, HOLDF: NORMAL
CREAT SERPL-MCNC: 1.24 MG/DL (ref 0.7–1.3)
DIFFERENTIAL METHOD BLD: ABNORMAL
DRUG SCRN COMMENT,DRGCM: NORMAL
EOSINOPHIL # BLD: 0.2 K/UL (ref 0–0.4)
EOSINOPHIL NFR BLD: 2 % (ref 0–7)
ERYTHROCYTE [DISTWIDTH] IN BLOOD BY AUTOMATED COUNT: 12.7 % (ref 11.5–14.5)
GLOBULIN SER CALC-MCNC: 3.4 G/DL (ref 2–4)
GLUCOSE BLD STRIP.AUTO-MCNC: 112 MG/DL (ref 65–117)
GLUCOSE SERPL-MCNC: 129 MG/DL (ref 65–100)
HCT VFR BLD AUTO: 42.5 % (ref 36.6–50.3)
HGB BLD-MCNC: 14.9 G/DL (ref 12.1–17)
IMM GRANULOCYTES # BLD AUTO: 0.1 K/UL (ref 0–0.04)
IMM GRANULOCYTES NFR BLD AUTO: 1 % (ref 0–0.5)
INR PPP: 1 (ref 0.9–1.1)
LYMPHOCYTES # BLD: 2.4 K/UL (ref 0.8–3.5)
LYMPHOCYTES NFR BLD: 27 % (ref 12–49)
MCH RBC QN AUTO: 32.5 PG (ref 26–34)
MCHC RBC AUTO-ENTMCNC: 35.1 G/DL (ref 30–36.5)
MCV RBC AUTO: 92.6 FL (ref 80–99)
METHADONE UR QL: NEGATIVE
MONOCYTES # BLD: 0.8 K/UL (ref 0–1)
MONOCYTES NFR BLD: 9 % (ref 5–13)
NEUTS SEG # BLD: 5.3 K/UL (ref 1.8–8)
NEUTS SEG NFR BLD: 60 % (ref 32–75)
NRBC # BLD: 0 K/UL (ref 0–0.01)
NRBC BLD-RTO: 0 PER 100 WBC
OPIATES UR QL: NEGATIVE
PCP UR QL: NEGATIVE
PLATELET # BLD AUTO: 239 K/UL (ref 150–400)
PMV BLD AUTO: 10.1 FL (ref 8.9–12.9)
POTASSIUM SERPL-SCNC: 4.7 MMOL/L (ref 3.5–5.1)
PROT SERPL-MCNC: 7.5 G/DL (ref 6.4–8.2)
PROTHROMBIN TIME: 10.2 SEC (ref 9–11.1)
RBC # BLD AUTO: 4.59 M/UL (ref 4.1–5.7)
SAMPLES BEING HELD,HOLD: NORMAL
SERVICE CMNT-IMP: NORMAL
SODIUM SERPL-SCNC: 141 MMOL/L (ref 136–145)
WBC # BLD AUTO: 8.9 K/UL (ref 4.1–11.1)

## 2022-12-18 PROCEDURE — 70450 CT HEAD/BRAIN W/O DYE: CPT

## 2022-12-18 PROCEDURE — 74011000636 HC RX REV CODE- 636: Performed by: EMERGENCY MEDICINE

## 2022-12-18 PROCEDURE — 85025 COMPLETE CBC W/AUTO DIFF WBC: CPT

## 2022-12-18 PROCEDURE — 74011250637 HC RX REV CODE- 250/637: Performed by: EMERGENCY MEDICINE

## 2022-12-18 PROCEDURE — 94761 N-INVAS EAR/PLS OXIMETRY MLT: CPT

## 2022-12-18 PROCEDURE — G0378 HOSPITAL OBSERVATION PER HR: HCPCS

## 2022-12-18 PROCEDURE — 85610 PROTHROMBIN TIME: CPT

## 2022-12-18 PROCEDURE — 82962 GLUCOSE BLOOD TEST: CPT

## 2022-12-18 PROCEDURE — 93005 ELECTROCARDIOGRAM TRACING: CPT

## 2022-12-18 PROCEDURE — 36415 COLL VENOUS BLD VENIPUNCTURE: CPT

## 2022-12-18 PROCEDURE — 80053 COMPREHEN METABOLIC PANEL: CPT

## 2022-12-18 PROCEDURE — 99285 EMERGENCY DEPT VISIT HI MDM: CPT

## 2022-12-18 PROCEDURE — 70496 CT ANGIOGRAPHY HEAD: CPT

## 2022-12-18 PROCEDURE — 0042T CT CODE NEURO PERF W CBF: CPT

## 2022-12-18 PROCEDURE — 80307 DRUG TEST PRSMV CHEM ANLYZR: CPT

## 2022-12-18 RX ORDER — GUAIFENESIN 100 MG/5ML
162 LIQUID (ML) ORAL DAILY
Status: DISCONTINUED | OUTPATIENT
Start: 2022-12-19 | End: 2022-12-18

## 2022-12-18 RX ORDER — GUAIFENESIN 100 MG/5ML
162 LIQUID (ML) ORAL
Status: COMPLETED | OUTPATIENT
Start: 2022-12-18 | End: 2022-12-18

## 2022-12-18 RX ORDER — LABETALOL HYDROCHLORIDE 5 MG/ML
10 INJECTION, SOLUTION INTRAVENOUS
Status: DISCONTINUED | OUTPATIENT
Start: 2022-12-18 | End: 2022-12-18

## 2022-12-18 RX ADMIN — IOPAMIDOL 100 ML: 755 INJECTION, SOLUTION INTRAVENOUS at 14:46

## 2022-12-18 RX ADMIN — ASPIRIN 162 MG: 81 TABLET, CHEWABLE ORAL at 17:05

## 2022-12-18 NOTE — PROGRESS NOTES
-Please complete MRI History and Safety Screening Form   - Patient cannot be scanned until this form is completed, including signatures, and reviewed in MRI to ensure patient is SAFE and eligible for MRI. - CALL MRI when this has been successfully completed at [phone].

## 2022-12-18 NOTE — ED TRIAGE NOTES
Pt c/o left arm tingling that started about noon today. Denies falls or injury. Pt also reports some dizziness and feeling as though his left ear needed to pop.      MD called to beside and code S called

## 2022-12-18 NOTE — PROGRESS NOTES
MRI attempted. Pt severely claustrophobic. Unable to go into scanner. Pt refused MRI and to try with medication.  Pt sent back to floor

## 2022-12-18 NOTE — ED PROVIDER NOTES
EMERGENCY DEPARTMENT HISTORY AND PHYSICAL EXAM      Date: 12/18/2022  Patient Name: Luciana Kraus    History of Presenting Illness     Chief Complaint   Patient presents with    Extremity Weakness       History Provided By: Patient    HPI: Luciana Kraus, 52 y.o. male presents to the ED with cc of L arm paresthesias. 52 YOM presents with LKW of 1200. Patient reports he was shopping when he began to note L arm paresthesias. Patient reports symptoms began with L proximal arm paresthesias which radiated to L hand. Moderate in intensity, no alleviating or aggravating factors. No history of stroke, not on blood thinners. Reports a \"pressure\" in head, no headache. Fullness in L ear. No neck pain, no chest pain or shortness of breath. Denies other complaints. Asked to evaluate in triage for CODE Stroke. There are no other complaints, changes, or physical findings at this time. PCP: Franny Lopez MD    No current facility-administered medications on file prior to encounter. Current Outpatient Medications on File Prior to Encounter   Medication Sig Dispense Refill    metoprolol tartrate (LOPRESSOR) 25 mg tablet Take  by mouth two (2) times a day. sertraline (ZOLOFT) 50 mg tablet Take  by mouth daily. aspirin delayed-release 81 mg tablet Take  by mouth daily. lisinopril (PRINIVIL, ZESTRIL) 5 mg tablet Take 5 mg by mouth daily.          Past History     Past Medical History:  Past Medical History:   Diagnosis Date    Cardiomyopathy ([address]     Heart failure ([address]     Hypertension     Morbid obesity ([address]     Psychiatric disorder     depression and anxiety    Sleep apnea        Past Surgical History:  Past Surgical History:   Procedure Laterality Date    HX ORTHOPAEDIC      wrist repair       Family History:  Family History   Problem Relation Age of Onset    Hypertension Father     Depression Father     Stroke Paternal Grandfather        Social History:  Social History     Tobacco Use Smoking status: Former    Smokeless tobacco: Never   Substance Use Topics    Alcohol use: Yes     Comment: social    Drug use: No       Allergies:  No Known Allergies      Review of Systems   Review of Systems   Unable to perform ROS: Acuity of condition     Physical Exam   Physical Exam  Vitals and nursing note reviewed. Constitutional:       Comments: 52 YOM, appears moderately anxious, but non-toxic   HENT:      Head: Normocephalic and atraumatic. Nose: Nose normal.      Mouth/Throat:      Mouth: Mucous membranes are moist.   Eyes:      Pupils: Pupils are equal, round, and reactive to light. Cardiovascular:      Rate and Rhythm: Normal rate and regular rhythm. Pulses: Normal pulses. Heart sounds: No murmur heard. No friction rub. No gallop. Comments: 2+ symmetrical radial pulses  Pulmonary:      Effort: Pulmonary effort is normal.      Breath sounds: Normal breath sounds. No wheezing, rhonchi or rales. Abdominal:      General: Abdomen is flat. There is no distension. Palpations: Abdomen is soft. Tenderness: There is no abdominal tenderness. Musculoskeletal:         General: No tenderness. Normal range of motion. Cervical back: Normal range of motion. No rigidity or tenderness. Right lower leg: No edema. Left lower leg: No edema. Skin:     General: Skin is warm and dry. Capillary Refill: Capillary refill takes less than 2 seconds. Neurological:      Mental Status: He is alert. Comments: NIH 1. L arm paresthesias, otherwise normal exam.   Psychiatric:         Mood and Affect: Mood normal.       Diagnostic Study Results     Labs -     Recent Results (from the past 12 hour(s))   CBC WITH AUTOMATED DIFF    Collection Time: 12/18/22  2:38 PM   Result Value Ref Range    WBC 8.9 4.1 - 11.1 K/uL    RBC 4.59 4. 10 - 5.70 M/uL    HGB 14.9 12.1 - 17.0 g/dL    HCT 42.5 36.6 - 50.3 %    MCV 92.6 80.0 - 99.0 FL    MCH 32.5 26.0 - 34.0 PG    MCHC 35.1 30.0 - 36.5 g/dL    RDW 12.7 11.5 - 14.5 %    PLATELET 660 193 - 706 K/uL    MPV 10.1 8.9 - 12.9 FL    NRBC 0.0 0  WBC    ABSOLUTE NRBC 0.00 0.00 - 0.01 K/uL    NEUTROPHILS 60 32 - 75 %    LYMPHOCYTES 27 12 - 49 %    MONOCYTES 9 5 - 13 %    EOSINOPHILS 2 0 - 7 %    BASOPHILS 1 0 - 1 %    IMMATURE GRANULOCYTES 1 (H) 0.0 - 0.5 %    ABS. NEUTROPHILS 5.3 1.8 - 8.0 K/UL    ABS. LYMPHOCYTES 2.4 0.8 - 3.5 K/UL    ABS. MONOCYTES 0.8 0.0 - 1.0 K/UL    ABS. EOSINOPHILS 0.2 0.0 - 0.4 K/UL    ABS. BASOPHILS 0.1 0.0 - 0.1 K/UL    ABS. IMM. GRANS. 0.1 (H) 0.00 - 0.04 K/UL    DF AUTOMATED     METABOLIC PANEL, COMPREHENSIVE    Collection Time: 12/18/22  2:38 PM   Result Value Ref Range    Sodium 141 136 - 145 mmol/L    Potassium 4.7 3.5 - 5.1 mmol/L    Chloride 105 97 - 108 mmol/L    CO2 32 21 - 32 mmol/L    Anion gap 4 (L) 5 - 15 mmol/L    Glucose 129 (H) 65 - 100 mg/dL    BUN 17 6 - 20 MG/DL    Creatinine 1.24 0.70 - 1.30 MG/DL    BUN/Creatinine ratio 14 12 - 20      eGFR >60 >60 ml/min/1.73m2    Calcium 9.4 8.5 - 10.1 MG/DL    Bilirubin, total 0.6 0.2 - 1.0 MG/DL    ALT (SGPT) 70 12 - 78 U/L    AST (SGOT) 25 15 - 37 U/L    Alk. phosphatase 65 45 - 117 U/L    Protein, total 7.5 6.4 - 8.2 g/dL    Albumin 4.1 3.5 - 5.0 g/dL    Globulin 3.4 2.0 - 4.0 g/dL    A-G Ratio 1.2 1.1 - 2.2     PROTHROMBIN TIME + INR    Collection Time: 12/18/22  2:38 PM   Result Value Ref Range    INR 1.0 0.9 - 1.1      Prothrombin time 10.2 9.0 - 11.1 sec   SAMPLES BEING HELD    Collection Time: 12/18/22  2:38 PM   Result Value Ref Range    SAMPLES BEING HELD DRK GRN     COMMENT        Add-on orders for these samples will be processed based on acceptable specimen integrity and analyte stability, which may vary by analyte.    GLUCOSE, POC    Collection Time: 12/18/22  2:44 PM   Result Value Ref Range    Glucose (POC) 112 65 - 117 mg/dL    Performed by Fariba Jacobo RN    EKG, 12 LEAD, INITIAL    Collection Time: 12/18/22  3:12 PM   Result Value Ref Range Ventricular Rate 77 BPM    Atrial Rate 77 BPM    P-R Interval 156 ms    QRS Duration 90 ms    Q-T Interval 354 ms    QTC Calculation (Bezet) 400 ms    Calculated P Axis 37 degrees    Calculated R Axis 108 degrees    Calculated T Axis 90 degrees    Diagnosis       Normal sinus rhythm  Rightward axis  No previous ECGs available         Radiologic Studies -   CTA CODE NEURO HEAD AND NECK W CONT         CT CODE NEURO PERF W CBF         CT CODE NEURO HEAD WO CONTRAST   Final Result   No evidence of acute intracranial abnormality. MRI BRAIN WO CONT    (Results Pending)     CT Results  (Last 48 hours)                 12/18/22 1500  CTA CODE NEURO HEAD AND NECK W CONT Preliminary result      This result has not been signed. Information might be incomplete. Narrative:  **PRELIMINARY REPORT**       1. No large vessel occlusion, aneurysm, or dissection. 2. No evidence of perfusion abnormality. 3. Patent carotid and vertebral arteries without evidence of flow-limiting   stenosis. Preliminary report was provided by Dr. Yuri Villela, the on-call radiologist, at 1534   hours EST on December 18, 2022. Final report to follow. **END PRELIMINARY REPORT                               12/18/22 1500  CT CODE NEURO PERF W CBF Preliminary result      This result has not been signed. Information might be incomplete. Narrative:  **PRELIMINARY REPORT**       1. No large vessel occlusion, aneurysm, or dissection. 2. No evidence of perfusion abnormality. 3. Patent carotid and vertebral arteries without evidence of flow-limiting   stenosis. Preliminary report was provided by Dr. Yuri Villela, the on-call radiologist, at 1534   hours EST on December 18, 2022. Final report to follow. **END PRELIMINARY REPORT**                               12/18/22 1449  CT CODE NEURO HEAD WO CONTRAST Final result    Impression:  No evidence of acute intracranial abnormality.                 Narrative:  EXAM: CT CODE NEURO HEAD WO CONTRAST       INDICATION: Code Stroke       COMPARISON: CT head without contrast June 26, 2014. CONTRAST: None. TECHNIQUE: Unenhanced CT of the head was performed using 5 mm images. Brain and   bone windows were generated. Coronal and sagittal reformats. CT dose reduction   was achieved through use of a standardized protocol tailored for this   examination and automatic exposure control for dose modulation. FINDINGS:   The ventricles and sulci are normal in size, shape and configuration. . There is   no significant white matter disease. There is no intracranial hemorrhage,   extra-axial collection, or mass effect. The basilar cisterns are open. No CT   evidence of acute infarct. The bone windows demonstrate no abnormalities. The visualized portions of the   paranasal sinuses and mastoid air cells are clear. CXR Results  (Last 48 hours)      None            Medical Decision Making   I am the first provider for this patient. I reviewed the vital signs, available nursing notes, past medical history, past surgical history, family history and social history. Vital Signs-Reviewed the patient's vital signs. Patient Vitals for the past 12 hrs:   Temp Pulse Resp BP SpO2   12/18/22 1545 -- 76 13 138/86 --   12/18/22 1530 -- 79 14 (!) 138/92 93 %   12/18/22 1515 -- 81 15 (!) 171/96 96 %   12/18/22 1502 -- 88 16 (!) 141/111 --   12/18/22 1439 98.6 °F (37 °C) 83 18 (!) 189/116 100 %     Records Reviewed: Nursing Notes and Old Medical Records    Provider Notes (Medical Decision Making):     45-year-old male presents emergency department with a chief complaint of left arm paresthesias that began 2 and half hours ago. NIH 1. Code stroke initiated on arrival.  Differential includes intracranial hemorrhage given hypertension, seizure, peripheral lesion or CVA. Will obtain basic stroke labs, discussed with teleneurology.     ED Course:   Initial assessment performed. The patients presenting problems have been discussed, and they are in agreement with the care plan formulated and outlined with them. I have encouraged them to ask questions as they arise throughout their visit. ED Course as of 12/18/22 1627   Sun Dec 18, 2022   1450 Spoke to teleneurology who will evaluate the patient. [MB]   1512 D/w tele neurology, no tPA given non-disabling symptoms and NIH 1. [MB]   1622 Labs reviewed and unremarkable. Patient reassessed with improved left-sided paresthesias. [MB]      ED Course User Index  [MB] Krishan Barr MD     Critical Care Time:   CRITICAL CARE NOTE :    2:44 PM    IMPENDING DETERIORATION -CNS  ASSOCIATED RISK FACTORS - CNS Decompensation  MANAGEMENT- Bedside Assessment  INTERPRETATION -  CT Scan and Blood Pressure  INTERVENTIONS - consideration of tPA  CASE REVIEW - Hospitalist/Intensivist, Medical Sub-Specialist, and Nursing  TREATMENT RESPONSE -Stable  PERFORMED BY - Self    NOTES   :  I have spent 45 minutes of critical care time involved in lab review, consultations with specialist, family decision- making, bedside attention and documentation. This time excludes time spent in any separate billed procedures. During this entire length of time I was immediately available to the patient . Juarez Champagne MD      Disposition:    Admitted    Diagnosis     Clinical Impression:   1. Cerebrovascular accident (CVA), unspecified mechanism ([address]    2. Left arm numbness        Attestations:    Juarez Champagne MD        Please note that this dictation was completed with ChaCha, the computer voice recognition software. Quite often unanticipated grammatical, syntax, homophones, and other interpretive errors are inadvertently transcribed by the computer software. Please disregard these errors. Please excuse any errors that have escaped final proofreading. Thank you.

## 2022-12-19 ENCOUNTER — APPOINTMENT (OUTPATIENT)
Dept: NON INVASIVE DIAGNOSTICS | Age: 47
End: 2022-12-19
Attending: HOSPITALIST
Payer: COMMERCIAL

## 2022-12-19 ENCOUNTER — APPOINTMENT (OUTPATIENT)
Dept: CT IMAGING | Age: 47
End: 2022-12-19
Attending: INTERNAL MEDICINE
Payer: COMMERCIAL

## 2022-12-19 VITALS
RESPIRATION RATE: 14 BRPM | OXYGEN SATURATION: 96 % | HEART RATE: 78 BPM | TEMPERATURE: 98.2 F | SYSTOLIC BLOOD PRESSURE: 143 MMHG | DIASTOLIC BLOOD PRESSURE: 99 MMHG | BODY MASS INDEX: 40.43 KG/M2 | WEIGHT: 315 LBS | HEIGHT: 74 IN

## 2022-12-19 LAB
ATRIAL RATE: 77 BPM
CALCULATED P AXIS, ECG09: 37 DEGREES
CALCULATED R AXIS, ECG10: 108 DEGREES
CALCULATED T AXIS, ECG11: 90 DEGREES
CHOLEST SERPL-MCNC: 165 MG/DL
DIAGNOSIS, 93000: NORMAL
EST. AVERAGE GLUCOSE BLD GHB EST-MCNC: 108 MG/DL
HBA1C MFR BLD: 5.4 % (ref 4–5.6)
HDLC SERPL-MCNC: 43 MG/DL
HDLC SERPL: 3.8 {RATIO} (ref 0–5)
LDLC SERPL CALC-MCNC: 93.6 MG/DL (ref 0–100)
P-R INTERVAL, ECG05: 156 MS
Q-T INTERVAL, ECG07: 354 MS
QRS DURATION, ECG06: 90 MS
QTC CALCULATION (BEZET), ECG08: 400 MS
TRIGL SERPL-MCNC: 142 MG/DL (ref ?–150)
TROPONIN-HIGH SENSITIVITY: 7 NG/L (ref 0–76)
VENTRICULAR RATE, ECG03: 77 BPM
VIT B12 SERPL-MCNC: 405 PG/ML (ref 193–986)
VLDLC SERPL CALC-MCNC: 28.4 MG/DL

## 2022-12-19 PROCEDURE — 84484 ASSAY OF TROPONIN QUANT: CPT

## 2022-12-19 PROCEDURE — 99205 OFFICE O/P NEW HI 60 MIN: CPT | Performed by: INTERNAL MEDICINE

## 2022-12-19 PROCEDURE — 74011250637 HC RX REV CODE- 250/637: Performed by: HOSPITALIST

## 2022-12-19 PROCEDURE — 82607 VITAMIN B-12: CPT

## 2022-12-19 PROCEDURE — G0378 HOSPITAL OBSERVATION PER HR: HCPCS

## 2022-12-19 PROCEDURE — 36415 COLL VENOUS BLD VENIPUNCTURE: CPT

## 2022-12-19 PROCEDURE — 83036 HEMOGLOBIN GLYCOSYLATED A1C: CPT

## 2022-12-19 PROCEDURE — 80061 LIPID PANEL: CPT

## 2022-12-19 PROCEDURE — 70450 CT HEAD/BRAIN W/O DYE: CPT

## 2022-12-19 RX ORDER — ACETAMINOPHEN 650 MG/1
650 SUPPOSITORY RECTAL
Status: DISCONTINUED | OUTPATIENT
Start: 2022-12-19 | End: 2022-12-19 | Stop reason: HOSPADM

## 2022-12-19 RX ORDER — ATORVASTATIN CALCIUM 40 MG/1
40 TABLET, FILM COATED ORAL
Status: DISCONTINUED | OUTPATIENT
Start: 2022-12-19 | End: 2022-12-19 | Stop reason: HOSPADM

## 2022-12-19 RX ORDER — LABETALOL HYDROCHLORIDE 5 MG/ML
5 INJECTION, SOLUTION INTRAVENOUS
Status: DISCONTINUED | OUTPATIENT
Start: 2022-12-19 | End: 2022-12-19 | Stop reason: HOSPADM

## 2022-12-19 RX ORDER — CLOPIDOGREL BISULFATE 75 MG/1
75 TABLET ORAL DAILY
Status: DISCONTINUED | OUTPATIENT
Start: 2022-12-19 | End: 2022-12-19 | Stop reason: HOSPADM

## 2022-12-19 RX ORDER — GUAIFENESIN 100 MG/5ML
81 LIQUID (ML) ORAL DAILY
Status: DISCONTINUED | OUTPATIENT
Start: 2022-12-19 | End: 2022-12-19 | Stop reason: HOSPADM

## 2022-12-19 RX ORDER — METOPROLOL SUCCINATE 25 MG/1
25 TABLET, EXTENDED RELEASE ORAL DAILY
Status: DISCONTINUED | OUTPATIENT
Start: 2022-12-19 | End: 2022-12-19 | Stop reason: HOSPADM

## 2022-12-19 RX ORDER — ENOXAPARIN SODIUM 100 MG/ML
30 INJECTION SUBCUTANEOUS 2 TIMES DAILY
Status: DISCONTINUED | OUTPATIENT
Start: 2022-12-19 | End: 2022-12-19 | Stop reason: HOSPADM

## 2022-12-19 RX ORDER — LISINOPRIL 5 MG/1
5 TABLET ORAL DAILY
Status: DISCONTINUED | OUTPATIENT
Start: 2022-12-19 | End: 2022-12-19 | Stop reason: HOSPADM

## 2022-12-19 RX ORDER — ATORVASTATIN CALCIUM 40 MG/1
40 TABLET, FILM COATED ORAL
Qty: 30 TABLET | Refills: 0 | Status: SHIPPED | OUTPATIENT
Start: 2022-12-19

## 2022-12-19 RX ORDER — ALBUMIN HUMAN 250 G/1000ML
12.5 SOLUTION INTRAVENOUS AS NEEDED
Status: DISCONTINUED | OUTPATIENT
Start: 2022-12-19 | End: 2022-12-19

## 2022-12-19 RX ORDER — ACETAMINOPHEN 325 MG/1
650 TABLET ORAL
Status: DISCONTINUED | OUTPATIENT
Start: 2022-12-19 | End: 2022-12-19 | Stop reason: HOSPADM

## 2022-12-19 RX ORDER — CLOPIDOGREL BISULFATE 75 MG/1
75 TABLET ORAL DAILY
Qty: 20 TABLET | Refills: 0 | Status: SHIPPED | OUTPATIENT
Start: 2022-12-19

## 2022-12-19 RX ORDER — METOPROLOL SUCCINATE 25 MG/1
25 TABLET, EXTENDED RELEASE ORAL DAILY
COMMUNITY

## 2022-12-19 RX ORDER — GUAIFENESIN 100 MG/5ML
81 LIQUID (ML) ORAL DAILY
Qty: 30 TABLET | Refills: 1 | Status: SHIPPED | OUTPATIENT
Start: 2022-12-20

## 2022-12-19 RX ADMIN — LISINOPRIL 5 MG: 5 TABLET ORAL at 08:17

## 2022-12-19 RX ADMIN — METOPROLOL SUCCINATE 25 MG: 25 TABLET, FILM COATED, EXTENDED RELEASE ORAL at 08:17

## 2022-12-19 RX ADMIN — ASPIRIN 81 MG: 81 TABLET, CHEWABLE ORAL at 08:17

## 2022-12-19 NOTE — DISCHARGE INSTRUCTIONS
It is important you follow up with neurology and try to obtain an open MRI due to claustrophobia. You must take aspirin and plavix for a total of 21 days, then discontinue your plavix and take only aspirin. Please follow up with your cardiologist to obtain an echocardiogram with bubble study.

## 2022-12-19 NOTE — H&P
Hospitalist Admission Note    NAME: Forrest Peters   :  1975   MRN:  [id number]     Date/Time:  2022 11:49 PM    Patient PCP: Xavier Tellez MD  Cardiologist:  Dr. Elle Waite at [facility]    Please note that this dictation was completed with [employer], the computer voice recognition software. Quite often unanticipated grammatical, syntax, homophones, and other interpretive errors are inadvertently transcribed by the computer software. Please disregard these errors. Please excuse any errors that have escaped final proofreading  ______________________________________________________________________   Assessment & Plan:  Transient left arm numbness with head pressure, POA  Suspect TIA. Ddx complex migraine, anxiety/stress attack, cervical radiculopathy  --level 1 stroke alert called in ER. Seen by teleneurologist and tenecteplase not recommended as NIHSS=1.  --CTA head and neck without LVO or perfusion defect. --MRI brain ordered by ER physician; patient with claustrophobia but was not premedicated and unable to complete study. He reports would only agree to try again if he is knocked out. Sx lasted from noon to 5:30pm and has now resolved. He reports having 3 prior similar episodes and been to [facility] and [facility] in [facility] and they were attributed to panic attacks. Questioned why this is first facility that MRI brain is recommended. --check lipid panel, A1c, troponin, B12; UDS ordered and negative  --check echo for thrombus or PFO given hx of cardiomyopathy  --continue aspirin 81mg daily  --neurology consult  --observation status    Intrinsic cardiomyopathy  Chronic HTN, BP markedly elevated initially 180 but normalized without intervention  --dx  with initial EF <30%, negative cath per patient. EF has improved to 45%, last echo 2 years ago per patient  --cont lisinopril, toprol XL  --check echo    Obesity  Body mass index is 42.37 kg/m².     Code: full  DVT prophylaxis: lovenox  Surrogate decision maker:  wife          Subjective:   CHIEF COMPLAINT:  left arm numbness and tingling    HISTORY OF PRESENT ILLNESS:     Wero James is a 52 y.o. male with PMH significant for nonischemic cardiomyopathy dx 2011 with initial EF <30% reportedly, HTN, presented with acute onset of left arm numbness while [place] shopping, started in left hand and went up into left shoulder; associated with pressure in head and not feeling right, fullness in left ear. No chest pain, n/v, SOB, dizziness. No recent illness. Level 1 stroke alert called in ER. NIH score =1 and tenecteplase not recommended. He denies any neck pain, fever, chills, no hx of cervical spine stenosis or herniated disc. Chart reviewed showed seen at [facility] 9/2017 for OA of cervical spine. We were asked to admit for work up and evaluation of the above problems. Past Medical History:   Diagnosis Date    Cardiomyopathy ([facility] 2011    EF <30%; Dr. Wilfred Camilo failure [facility])     Hypertension     Morbid obesity ([facility]     Psychiatric disorder     depression and anxiety    Sleep apnea       Past Surgical History:   Procedure Laterality Date    HX ORTHOPAEDIC      wrist repair     Social History     Tobacco Use    Smoking status: Former    Smokeless tobacco: Never   Substance Use Topics    Alcohol use: Yes     Comment: 5-6 drinks 2x/week      Drug use:  none  ,  of water pump system    Family History   Problem Relation Age of Onset    Hypertension Father     Depression Father     Stroke Paternal Grandfather      No Known Allergies     Prior to Admission medications    Medication Sig Start Date End Date Taking? Authorizing Provider   metoprolol succinate (TOPROL-XL) 25 mg XL tablet Take 25 mg by mouth daily. Yes Provider, Historical   lisinopril (PRINIVIL, ZESTRIL) 5 mg tablet Take 5 mg by mouth daily. Yes Other, MD Crys     REVIEW OF SYSTEMS:  POSITIVE= Bold.   Negative = normal text  General:  fever, chills, sweats, generalized weakness, weight loss/gain, loss of appetite  Eyes:  blurred vision, eye pain, loss of vision, diplopia  Ear Nose and Throat:  rhinorrhea, pharyngitis  Respiratory:   cough, sputum production, SOB, wheezing, CORTEZ, pleuritic pain  Cardiology:  chest pain, palpitations, orthopnea, PND, edema, syncope   Gastrointestinal:  abdominal pain, N/V, dysphagia, diarrhea, constipation, bleeding  Genitourinary:  frequency, urgency, dysuria, hematuria, incontinence  Muskuloskeletal :  arthralgia, myalgia  Hematology:  easy bruising, bleeding, lymphadenopathy  Dermatological:  rash, ulceration, pruritis  Endocrine:  hot flashes or polydipsia  Neurological:  headache pressure in head and fullness in left ear, dizziness, confusion, focal weakness, paresthesia, memory loss, gait disturbance  Psychological: anxiety, depression, agitation      Objective:   VITALS:    Visit Vitals  /84 (BP 1 Location: Left upper arm)   Pulse 72   Temp 98.6 °F (37 °C)   Resp 20   Ht 6' 2\" (1.88 m)   Wt 149.7 kg (330 lb)   SpO2 96%   BMI 42.37 kg/m²     Temp (24hrs), Av.3 °F (36.8 °C), Min:97.7 °F (36.5 °C), Max:98.6 °F (37 °C)    Body mass index is 42.37 kg/m². PHYSICAL EXAM:    General:    Alert, overweight male, cooperative, no distress, appears stated age. Patient seen on medical floor. HEENT: Atraumatic, anicteric sclerae, pink conjunctivae     No oral ulcers, mucosa moist, throat clear. Hearing intact. Neck:  Supple, symmetrical,  thyroid: non tender  Lungs:   Clear to auscultation bilaterally. No Wheezing or Rhonchi. No rales. Chest wall:  No tenderness  No Accessory muscle use. Heart:   Regular  rhythm,  No  murmur   No gallop. No edema. Abdomen:   Soft, non-tender. Not distended. Bowel sounds normal. No masses  Extremities: No cyanosis. No clubbing  Skin:     Not pale Not Jaundiced  No rashes   Psych:  Good insight. Not depressed.   Not anxious or agitated. Neurologic: EOMs intact. No facial asymmetry. No aphasia or slurred speech. Symmetrical strength, Alert and oriented X 3. Peripheral pulse: Bilateral, DP, 2+  Capillary refill:  normal    IMAGING RESULTS:   []       I have personally reviewed the actual   []     CXR  []     CT scan  CXR:  CT :  EKG: reviewed SR, right axis, no ST-T abnormality   ________________________________________________________________________  Care Plan discussed with:    Comments   Patient y    [facility]:      ________________________________________________________________________  Prophylaxis:  GI none   DVT lovenox   ________________________________________________________________________  Recommended Disposition:   Home with Family y   HH/PT/OT/RN    SNF/LTC    EVIN    ________________________________________________________________________  Code Status:  Full Code y   DNR/DNI    ________________________________________________________________________  TOTAL TIME:  55 minutes      Comments    y Reviewed previous records   >50% of visit spent in counseling and coordination of care  Discussion with patient and/or family and questions answered         ______________________________________________________________________  Gilmer Garcia MD      Procedures: see electronic medical records for all procedures/Xrays and details which were not copied into this note but were reviewed prior to creation of Plan. LAB DATA REVIEWED:    Recent Results (from the past 24 hour(s))   CBC WITH AUTOMATED DIFF    Collection Time: 12/18/22  2:38 PM   Result Value Ref Range    WBC 8.9 4.1 - 11.1 K/uL    RBC 4.59 4. 10 - 5.70 M/uL    HGB 14.9 12.1 - 17.0 g/dL    HCT 42.5 36.6 - 50.3 %    MCV 92.6 80.0 - 99.0 FL    MCH 32.5 26.0 - 34.0 PG    MCHC 35.1 30.0 - 36.5 g/dL    RDW 12.7 11.5 - 14.5 %    PLATELET 284 589 - 599 K/uL    MPV 10.1 8.9 - 12.9 FL    NRBC 0.0 0  WBC    ABSOLUTE NRBC 0.00 0.00 - 0.01 K/uL    NEUTROPHILS 60 32 - 75 %    LYMPHOCYTES 27 12 - 49 %    MONOCYTES 9 5 - 13 %    EOSINOPHILS 2 0 - 7 %    BASOPHILS 1 0 - 1 %    IMMATURE GRANULOCYTES 1 (H) 0.0 - 0.5 %    ABS. NEUTROPHILS 5.3 1.8 - 8.0 K/UL    ABS. LYMPHOCYTES 2.4 0.8 - 3.5 K/UL    ABS. MONOCYTES 0.8 0.0 - 1.0 K/UL    ABS. EOSINOPHILS 0.2 0.0 - 0.4 K/UL    ABS. BASOPHILS 0.1 0.0 - 0.1 K/UL    ABS. IMM. GRANS. 0.1 (H) 0.00 - 0.04 K/UL    DF AUTOMATED     METABOLIC PANEL, COMPREHENSIVE    Collection Time: 12/18/22  2:38 PM   Result Value Ref Range    Sodium 141 136 - 145 mmol/L    Potassium 4.7 3.5 - 5.1 mmol/L    Chloride 105 97 - 108 mmol/L    CO2 32 21 - 32 mmol/L    Anion gap 4 (L) 5 - 15 mmol/L    Glucose 129 (H) 65 - 100 mg/dL    BUN 17 6 - 20 MG/DL    Creatinine 1.24 0.70 - 1.30 MG/DL    BUN/Creatinine ratio 14 12 - 20      eGFR >60 >60 ml/min/1.73m2    Calcium 9.4 8.5 - 10.1 MG/DL    Bilirubin, total 0.6 0.2 - 1.0 MG/DL    ALT (SGPT) 70 12 - 78 U/L    AST (SGOT) 25 15 - 37 U/L    Alk. phosphatase 65 45 - 117 U/L    Protein, total 7.5 6.4 - 8.2 g/dL    Albumin 4.1 3.5 - 5.0 g/dL    Globulin 3.4 2.0 - 4.0 g/dL    A-G Ratio 1.2 1.1 - 2.2     PROTHROMBIN TIME + INR    Collection Time: 12/18/22  2:38 PM   Result Value Ref Range    INR 1.0 0.9 - 1.1      Prothrombin time 10.2 9.0 - 11.1 sec   SAMPLES BEING HELD    Collection Time: 12/18/22  2:38 PM   Result Value Ref Range    SAMPLES BEING HELD DRK GRN     COMMENT        Add-on orders for these samples will be processed based on acceptable specimen integrity and analyte stability, which may vary by analyte.    GLUCOSE, POC    Collection Time: 12/18/22  2:44 PM   Result Value Ref Range    Glucose (POC) 112 65 - 117 mg/dL    Performed by Jayson Panchal RN    EKG, 12 LEAD, INITIAL    Collection Time: 12/18/22  3:12 PM   Result Value Ref Range    Ventricular Rate 77 BPM    Atrial Rate 77 BPM    P-R Interval 156 ms    QRS Duration 90 ms    Q-T Interval 354 ms    QTC Calculation (Bezet) 400 ms    Calculated P Axis 37 degrees    Calculated R Axis 108 degrees    Calculated T Axis 90 degrees    Diagnosis       Normal sinus rhythm  Rightward axis  No previous ECGs available     DRUG SCREEN, URINE    Collection Time: 12/18/22  5:22 PM   Result Value Ref Range    AMPHETAMINES Negative NEG      BARBITURATES Negative NEG      BENZODIAZEPINES Negative NEG      COCAINE Negative NEG      METHADONE Negative NEG      OPIATES Negative NEG      PCP(PHENCYCLIDINE) Negative NEG      THC (TH-CANNABINOL) Negative NEG      Drug screen comment (NOTE)

## 2022-12-19 NOTE — ROUTINE PROCESS
End of Shift Note    Bedside shift change report given to Pamella Santiago RN(oncoming nurse) by Eliecer Tomas RN (offgoing nurse). Report included the following information SBAR, Kardex, Intake/Output, and MAR    Shift worked:  7p-7a   Shift summary and any significant changes:     Pt newly admitted 12/18. NIH 0, VSS, AOX4, and up adlib. Pt refused MRI, but echo still pending, AM labs drawn. No significant events over night. Patient refused bed alarm:    Is the patient alert and oriented Yes     Is patient education documented on risk/injury related to falls Patient verbalizes Yes     Voices understanding of education provided Yes          Concerns for physician to address: N.A   Zone phone for oncoming shift:   [phone]       Patient Information  Xander Abdi  52 y.o.  12/18/2022  2:42 PM by Benita Walter MD. Xander Abdi was admitted from Home    Problem List  Patient Active Problem List    Diagnosis Date Noted    Left arm numbness 12/18/2022    Hypertension 06/08/2017    [facility] 06/08/2017    Sleep apnea 06/08/2017     Past Medical History:   Diagnosis Date    Cardiomyopathy ([facility] 2011    EF <30%; Dr. Brigid Ndiaye failure [facility])     Hypertension     Morbid obesity ([facility]     Psychiatric disorder     depression and anxiety    Sleep apnea        Core Measures:  CVA: Yes Yes  CHF:No No  PNA:No No    Activity:  Activity Level: Up ad spencer  Number times ambulated in hallways past shift: 0  Number of times OOB to chair past shift: 5    Cardiac:   Cardiac Monitoring: Yes           Access:   Current line(s): PIV   Central Line? No Placement date    Reason Medically Necessary     PICC LINE? Not applicable Placement date   Reason Medically Necessary       Genitourinary:   Urinary status: voiding  Urinary Catheter?  Not applicable Placement Date    Reason Medically Necessary       Respiratory:   O2 Device: None (Room air)  Chronic home O2 use?: N/A  Incentive spirometer at bedside: N/A       GI:  Last Bowel Movement Date: 12/18/22  Current diet:  ADULT DIET Regular; Low Fat/Low Chol/High Fiber/CASSANDRA  Passing flatus: YES  Tolerating current diet: YES       Pain Management:   Patient states pain is manageable on current regimen: YES    Skin:  Franklyn Score: 23  Interventions: increase time out of bed    Patient Safety:  Fall Score:  Total Score: 1  Interventions: pt to call before getting OOB     @Rollbelt  @dexterity to release roll belt  Yes/No ( must document dexterity  here by stating Yes or No here, otherwise this is a restraint and must follow restraint documentation policy.)    DVT prophylaxis:  DVT prophylaxis Med- Yes  DVT prophylaxis SCD or MARIO- No     Wounds: (If Applicable)  Wounds- No  Location       Active Consults:  IP CONSULT TO HOSPITALIST  IP CONSULT TO NEUROLOGY    Length of Stay:  Expected LOS: - - -  Actual LOS: 0  Discharge Plan: Yes         Chava Hirsch RN

## 2022-12-19 NOTE — PROGRESS NOTES
Patient discharged home with his wife. His discharge instructions were reviewed with patient who voices understanding. All personal belongings taken with patient.

## 2022-12-19 NOTE — DISCHARGE SUMMARY
Hospitalist Discharge Summary     Patient ID:  Radha Hays  [id number]  52 y.o.  1975    PCP on record: German Brock MD    Admit date: 12/18/2022  Discharge date and time: 12/19/2022      DISCHARGE DIAGNOSIS:      TIA    CONSULTATIONS:  IP CONSULT TO HOSPITALIST  IP CONSULT TO NEUROLOGY    Excerpted HPI from H&P of Lucy Amado MD:    Radha Hays is a 52 y.o. male with PMH significant for nonischemic cardiomyopathy dx 2011 with initial EF <30% reportedly, HTN, presented with acute onset of left arm numbness while [place] shopping, started in left hand and went up into left shoulder; associated with pressure in head and not feeling right, fullness in left ear. No chest pain, n/v, SOB, dizziness. No recent illness. ______________________________________________________________________  DISCHARGE SUMMARY/HOSPITAL COURSE:  for full details see H&P, daily progress notes, labs, consult notes. Transient left arm numbness with head pressure, POA  Suspect TIA. Ddx complex migraine, anxiety/stress attack, cervical radiculopathy  --level 1 stroke alert called in ER. Seen by teleneurologist and tenecteplase not recommended as NIHSS=1.  --CTA head and neck without LVO or perfusion defect. --MRI brain ordered by ER physician; patient with claustrophobia but was not premedicated and unable to complete study. He reports would only agree to try again if he is knocked out. Sx lasted from noon to 5:30pm and has now resolved. He reports having 3 prior similar episodes and been to [address] and [address] [place] [address] and they were attributed to panic attacks. Questioned why this is first facility that MRI brain is recommended.   -MRI to be done as outpatient as per patient request  --check LDL 93.6, start high intensity statin  -A1C 5.4  --check echo for thrombus or PFO given hx of cardiomyopathy, patient wishes to get this done as outpatient  --DAPT for 3 weeks then mono therapy with ASA  --neurology consult  -Repeat CT head negative         Intrinsic cardiomyopathy  Chronic HTN, BP markedly elevated initially 180 but normalized without intervention  --dx 2011 with initial EF <30%, negative cath per patient. EF has improved to 45%, last echo 2 years ago per patient  --cont lisinopril, toprol XL  --check echo    Patient was adamant about wanting to go home. Long discussion was had about the risks of going home without a complete work up. Patient verbalized understanding and wished to follow up as an outpatient. Patient's symptoms had resolved at the time of DC.    _______________________________________________________________________  Patient seen and examined by me on discharge day. Pertinent Findings:  Gen:    Not in distress  Chest: Clear lungs  CVS:   Regular rhythm. No edema  Abd:  Soft, not distended, not tender  Neuro:  Alert, Oriented x 4, grossly non focal exam  _______________________________________________________________________  DISCHARGE MEDICATIONS:   Current Discharge Medication List        START taking these medications    Details   aspirin 81 mg chewable tablet Take 1 Tablet by mouth daily. Qty: 30 Tablet, Refills: 1  Start date: 12/20/2022      atorvastatin (LIPITOR) 40 mg tablet Take 1 Tablet by mouth nightly. Qty: 30 Tablet, Refills: 0  Start date: 12/19/2022      clopidogreL (PLAVIX) 75 mg tab Take 1 Tablet by mouth daily. Qty: 20 Tablet, Refills: 0  Start date: 12/19/2022           CONTINUE these medications which have NOT CHANGED    Details   metoprolol succinate (TOPROL-XL) 25 mg XL tablet Take 25 mg by mouth daily. lisinopril (PRINIVIL, ZESTRIL) 5 mg tablet Take 5 mg by mouth daily. My Recommended Diet, Activity, Wound Care, and follow-up labs are listed in the patient's Discharge Insturctions which I have personally completed and reviewed.   Risk of deterioration: Low    Condition at Discharge: Stable  _____________________________________________________________________    Disposition  Home with family, no needs  ____________________________________________________________________    Care Plan discussed with:   Patient, Family, RN, Care Manager, Consultant    ____________________________________________________________________    Code Status: Full Code  ____________________________________________________________________      Condition at Discharge:  Stable  _____________________________________________________________________  Follow up with:   PCP : Ezella Sicard, MD  Follow-up Information       Follow up With Specialties Details Why Contact Info    Ezella Sicard, MD Family Medicine Follow up  Haritha Valentine [address] [place] [address], [address] [place] [address],  Neurology Follow up in 1 week(s)  Donovan Reina  [facility]  [phone]      Cardiology  Follow up in 2 day(s) To obtain echocardiogram                 Total time in minutes spent coordinating this discharge (includes going over instructions, follow-up, prescriptions, and preparing report for sign off to her PCP) :  35 minutes    Signed:  Jacqueline Rodriguez MD

## 2022-12-19 NOTE — CONSULTS
Date of Consultation:  December 19, 2022    Referring Physician: Beena Gonzalez MD    Reason for Consultation: Left-arm paresthesias    Chief Complaint   Patient presents with    Extremity Weakness       History of Present Illness:   Gary Boss is a 52 y.o. male with history of cardiomyopathy (EF less than 30%), with heart failure, hypertension, obesity, depression and anxiety, sleep apnea not on CPAP, hypertension, hyperlipidemia, former smoker presenting to the ER after having symptoms of left arm paresthesias concerning for ischemic event. Symptoms have resolved over several hours. Stroke alert was called with NIH stroke scale of 1 and due to resolution resolution of symptoms, no interventions were done. Head CT unremarkable. CTA head and neck unremarkable other than 2 mm anterior communicating aneurysm. MRI not performed. The patient states that his symptoms have completely resolved. He has had episodes of this happen in the past however it usually would resolve quickly and only would cause some clamminess and muffled hearing but would never cause tingling in the shoulder and into the hand. He states that he was at the mall doing some [place] shopping but was not particularly stressed or anxious at the time. He noticed that his hearing became muffled on the left side and things around him seemed out of sync. He had no visual deficit, no headache, no dizziness, no weakness, no language or speech difficulty. Then he noticed that his left shoulder developed a pins and needle sensation that spread down into his hand. He has had panic attacks in the past however they have never caused tingling in his arm before. He is currently back to his baseline. He does suffer from claustrophobia and prefers not to have a brain MRI unless he is completely asleep during the entire time. He does have a history of cardiomyopathy with a low EF of 30%. He has never had any symptoms like this before.     Past Medical History:   Diagnosis Date    [facility]    EF <30%; Dr. Anthony Hirsch failure [facility])     Hypertension     Morbid obesity ([facility]     Psychiatric disorder     depression and anxiety    Sleep apnea         Past Surgical History:   Procedure Laterality Date    HX HEART CATHETERIZATION      HX ORTHOPAEDIC      wrist repair        Family History   Problem Relation Age of Onset    Alcohol abuse Mother     Hypertension Father     Depression Father     Stroke Maternal Grandfather     Stroke Paternal Grandfather         Social History     Tobacco Use    Smoking status: Former    Smokeless tobacco: Never   Substance Use Topics    Alcohol use: Yes     Comment: 5-6 drinks 2x/week        No Known Allergies     Prior to Admission medications    Medication Sig Start Date End Date Taking? Authorizing Provider   metoprolol succinate (TOPROL-XL) 25 mg XL tablet Take 25 mg by mouth daily. Yes Provider, Historical   lisinopril (PRINIVIL, ZESTRIL) 5 mg tablet Take 5 mg by mouth daily.    Yes Other, MD Crys       Review of Systems:    General, constitutional: negative  Eyes, vision: negative  Ears, nose, throat: negative  Cardiovascular, heart: negative  Respiratory: negative  Gastrointestinal: negative  Genitourinary: negative  Musculoskeletal: negative  Skin and integumentary: negative  Psychiatric: negative  Endocrine: negative  Neurological: negative, except for HPI  Hematologic/lymphatic: negative  Allergy/immunology: negative    []Unable to obtain  ROS due to  []mental status change  []sedated   []intubated      PHYSICAL EXAMINATION:   Visit Vitals  BP (!) 149/96   Pulse 75   Temp 97.9 °F (36.6 °C)   Resp 16   Ht 6' 2\" (1.88 m)   Wt 330 lb (149.7 kg)   SpO2 96%   BMI 42.37 kg/m²       Physical Exam:  General:  no acute distress  Neck: supple no mass   Lungs: Comfortable on room air  Heart: Well-perfused  Lower extremity: no edema    Neurological exam:  Mental Status: Awake, alert, oriented to person, place and time  Attention and Concentration: able to state the days of the week backwards   Speech and Language: No dysarthria. Able to name, repeat and follow commands   Fund of knowledge was preserved    Cranial nerves: II-XII:  Pupils equal and reactive, visual fields intact by confrontation   Extraocular movements intact, no evidence of nystagmus or ptosis   Facial sensation intact to light touch  Facial movements symmetric; no facial droop  Hearing intact to soft rub bilaterally   Shoulder shrug symmetric and strong   Tongue protrusion full and midline without fasciculation or atrophy    Motor:   Normal tone and Bulk   Drift: No evidence of pronator drift   Finger tapping equal and symmetric      Strength testing:   deltoid triceps biceps Wrist ext. Wrist flex. intrinsics   Right 5 5 5 5 5 5   Left 5 5 5 5 5 5      Hip flex. Hip ext. Knee ext. Knee flex Dorsi flex Plantar flex   Right  5 NT 5 5 5 5   Left  5 NT 5 5 5 5       Sensory:  Intact to light touch throughout intact to pinprick throughout    Reflexes:   Biceps Triceps  Brachiorad Patellar Achilles Plantar Hoffmans   Right  2 2 2 2 2 Down NT   Left  2 2 2 2 2 Down NT     Cerebellar testing:  No dysmetria. Normal rapid alternating movements; finger-to-nose and heel-to- shin testing are within normal limits. Gait: steady. Heel, toe, and tandem gait were normal      Data:     Lab Results   Component Value Date/Time    Hemoglobin A1c 5.4 12/19/2022 03:31 AM    Sodium 141 12/18/2022 02:38 PM    Potassium 4.7 12/18/2022 02:38 PM    Chloride 105 12/18/2022 02:38 PM    Glucose 129 (H) 12/18/2022 02:38 PM    BUN 17 12/18/2022 02:38 PM    Creatinine 1.24 12/18/2022 02:38 PM    Calcium 9.4 12/18/2022 02:38 PM    WBC 8.9 12/18/2022 02:38 PM    HCT 42.5 12/18/2022 02:38 PM    HGB 14.9 12/18/2022 02:38 PM    PLATELET 648 66/87/6135 02:38 PM       Imaging:    No results found for this or any previous visit.   As above      IMPRESSION/RECOMMENDATIONS:  Forrest Peters is a 52 y.o. male who presents with acute onset of left arm paresthesias concerning for possible TIA versus stroke however symptoms have since resolved over the course of several hours. Telemetry neurology was consulted with NIH stroke scale of 1 and thrombolytics deferred. CTA head and neck unremarkable for LVO or flow-limiting stenosis. Head CT unremarkable. Given resolution of symptoms, patient likely had a TIA. TIA vs Stroke: Etiology secondary to possible cardiomyopathy causing embolic events vs small vessel disease.    - Recommend maintaining SBP <220/120 for 24 hrs from symptom onset and then BP goal is less than 140/90 (this is the long term goal)   - would recommend to continue 81mg daily for secondary stroke prevention; please start Plavix 75 mg daily for 21 days along with aspirin, then discontinue Plavix and continue on aspirin 81 mg indefinitely. - Risk factor modification: LDL 97, hemoglobin A1c 5.4%   - if LDL > 70, would start atorvastatin 40mg daily    - please check hepatic panel prior to initiation of statin  - Would obtain MRI brain without contrast to rule out acute ischemia, please provide the patient with adequate sedation to tolerate the study however if patient is unable to safely be sedated, would recommend head CT noncontrast prior to discharge  - please obtain TTE to rule out intracardiac thrombus with bubble study   - would monitor on telemetry to rule out arrhythmias    - please obtain PT/OT and speech consultations     We discussed extensively the importance of lifestyle modification including smoking cessation, diet, and incorporating exercise into daily routine. O    Thank you very much for this consultation. I spent 60 minutes providing care to this acutely ill inpatient with > 50% of the time counseling and assisting in the coordination of care of the patient on the patient's hospital floor/unit.      Cyrus Mckinley DO

## 2022-12-19 NOTE — PROGRESS NOTES
Problem: Falls - Risk of  Goal: *Absence of Falls  Description: Document Mary Nguyen Fall Risk and appropriate interventions in the flowsheet.   Outcome: Progressing Towards Goal  Note: Fall Risk Interventions:            Medication Interventions: Bed/chair exit alarm, Patient to call before getting OOB, Teach patient to arise slowly                   Problem: TIA/CVA Stroke: 0-24 hours  Goal: Activity/Safety  Outcome: Progressing Towards Goal  Goal: Consults, if ordered  Outcome: Progressing Towards Goal  Goal: Diagnostic Test/Procedures  Outcome: Progressing Towards Goal  Goal: Nutrition/Diet  Outcome: Progressing Towards Goal  Goal: Discharge Planning  Outcome: Progressing Towards Goal  Goal: Medications  Outcome: Progressing Towards Goal  Goal: Respiratory  Outcome: Progressing Towards Goal  Goal: Treatments/Interventions/Procedures  Outcome: Progressing Towards Goal  Goal: Minimize risk of bleeding post-thrombolytic infusion  Outcome: Progressing Towards Goal  Goal: Monitor for complications post-thrombolytic infusion  Outcome: Progressing Towards Goal  Goal: Psychosocial  Outcome: Progressing Towards Goal  Goal: *Hemodynamically stable  Outcome: Progressing Towards Goal  Goal: *Neurologically stable  Description: Absence of additional neurological deficits    Outcome: Progressing Towards Goal  Goal: *Verbalizes anxiety and depression are reduced or absent  Outcome: Progressing Towards Goal  Goal: *Absence of Signs of Aspiration on Current Diet  Outcome: Progressing Towards Goal  Goal: *Absence of deep venous thrombosis signs and symptoms(Stroke Metric)  Outcome: Progressing Towards Goal  Goal: *Ability to perform ADLs and demonstrates progressive mobility and function  Outcome: Progressing Towards Goal  Goal: *Stroke education started(Stroke Metric)  Outcome: Progressing Towards Goal  Goal: *Dysphagia screen performed(Stroke Metric)  Outcome: Progressing Towards Goal  Goal: *Rehab consulted(Stroke Metric)  Outcome: Progressing Towards Goal

## 2023-01-03 ENCOUNTER — TELEPHONE (OUTPATIENT)
Dept: NEUROLOGY | Age: 48
End: 2023-01-03

## 2023-01-03 NOTE — PROGRESS NOTES
[address] [facility] [place]. [place], [address]   Tel.  [phone]   Fax. 1052 [facility]   [place], [address] [facility]   Tel.  [phone]   Fax. [phone] 9250 Lisa Gallo   Tel.  [phone]   Fax. [phone] (: 1975) is a 52 y.o. male, established patient, seen for positive airway pressure follow-up, he was last seen by Dr. Ashley Welsh on 2020, prior notes reviewed in detail. In lab sleep test 2017 showed AHI of 9/hr. He is seen today for follow up/ re-establish care. ASSESSMENT/PLAN:    ICD-10-CM ICD-9-CM    1. BRIE (obstructive sleep apnea)  G47.33 327.23 SPLIT CPAP/PSG      2. Obesity hypoventilation syndrome (HCC)  E66.2 278.03 SPLIT CPAP/PSG      3. TIA (transient ischemic attack)  G45.9 435.9 SPLIT CPAP/PSG      4. Cardiomyopathy, unspecified type ([facility]  I42.9 425.4 SPLIT CPAP/PSG      5. Primary hypertension  I10 401.9       6. BMI 40.0-44.9, adult ([facility]  Z68.41 V85.41         AHI = 9 (2017). On Respironics APAP :  8-13 cmH2O. Set up . Sleep Apnea -  He had a device years ago [employer]) however notes that he has not used it in over 2 years. He may still have it in his possesion, he is unsure. He notes that his sleep over the years has persistently worsened. It often times takes him a long time to fall asleep. He reports having to \"exhaust himself\" in order to get to sleep. He notes he may have to work in the garage, or talk to friends until late at night until he is fatigued enough to fall asleep. If and when he does get to sleep, he awakens 20-30 minutes later and tosses and turns throughout the night. He notes he wakes up with severe dry mouth and has been told of snoring loudly at night. He was recently hospitalized within the last 2 weeks for numbness and tingling in his left arm. He was told he had a TIA. Work up during that hospital visit was unremarkable.  He is followed by Cardiology for heart failure (initial EF <30%). He has a repeat Echocardiogram in the coming weeks. He is also following up with Neurology. Recent lab work shows Co2 32 mmol/L. BMI 42. About a ~40 lb weight gain since initial testing in 2017. He will be evaluated with a split sleep study. Results to be reviewed with him to discuss next steps once study complete. Orders Placed This Encounter    [id number] POLYSOMNOGRAPHY SPLIT STUDY     Standing Status:   Future     Standing Expiration Date:   1/4/2024     Scheduling Instructions:      Route to Dr. Carla Lane. Order Specific Question:   Reason for Exam     Answer:   BRIE/OHS/recent TIA     *  Counseling was provided regarding the importance of regular PAP use with emphasis on ensuring sufficient total sleep time, proper sleep hygiene, and safe driving. * Re-enforced proper and regular cleaning for the device. * He was asked to contact our office for any problems regarding PAP therapy. 2. OHS - Co2 12/2022 32 mmol/L. BMI 42    3. TIA - recent hospitalization for left arm numbness and tingling - followed by Neurology. 4. Cardiomyopathy - followed by Cards. Repeat Echo in the coming weeks. 5. Hypertension -  continue on his current regimen, he will continue to monitor his BP and follow up with his PMD for reevaluation/adjustment of medications if warranted. I have reviewed the relationship between hypertension as it relates to sleep-disordered breathing. 6. Recommended a dedicated weight loss program through appropriate diet and exercise regimen as significant weight reduction has been shown to reduce severity of obstructive sleep apnea. SUBJECTIVE/OBJECTIVE:    Review of device download indicated:  Last data available from 2020     [place] Sleepiness Score: (P) 9 and Modified F.O.S.Q. Score Total / 2: (P) 14 which reflects improved sleep quality over therapy time. Sleep Review of Systems: positive for difficulty falling asleep;  Positive awakenings at night; Negative early morning headaches; Negative memory problems; Negative concentration issues; Negative chest pain; Negative shortness of breath; Negative significant joint pain at night; Negative significant muscle pain at night; Negative rashes or itching; Negative heartburn; Negative significant mood issues    Vitals reported by patient   Patient-Reported Vitals 1/3/2023   Patient-Reported Weight 316   Patient-Reported Pulse 81   Patient-Reported Temperature -   Patient-Reported SpO2 -   Patient-Reported Systolic  665   Patient-Reported Diastolic 84      Calculated BMI 42    Physical Exam completed by visual and auditory observation of patient with verbal input from patient. General:   Alert, oriented, not in acute distress   Eyes:  Anicteric Sclerae; no obvious strabismus   Nose:  No obvious nasal septum deviation    Neck:   Midline trachea, no visible mass   Chest/Lungs:  Respiratory effort normal, no visualized signs of difficulty breathing or respiratory distress   CVS:  No JVD   Extremities:  No obvious rashes noted on face, neck, or hands   Neuro:  No facial asymmetry, no focal deficits; no obvious tremor    Psych:  Normal affect,  normal countenance     Chaitanya Cabrera is being evaluated by a Virtual Visit (video visit) encounter to address concerns as mentioned above. A caregiver was present when appropriate. Due to this being a TeleHealth encounter (During XSPIQ-68 public health emergency), evaluation of the following organ systems was limited: Vitals/Constitutional/EENT/Resp/CV/GI//MS/Neuro/Skin/Heme-Lymph-Imm. Pursuant to the emergency declaration under the [facility], [address] [place] [address] and the [employer] Act, this Virtual Visit was conducted with patient's (and/or legal guardian's) consent, to reduce the patient's risk of exposure to COVID-19 and provide necessary medical care.  The patient (or guardian if applicable) is aware that this is a billable service, which includes applicable copays. Patient identification was verified at the start of the visit: YES using name and date of birth. Patient's phone number [phone] (home)  was confirmed for accuracy. He gives permission for messages regarding results and appointments to be left at that number. Services were provided through a video synchronous discussion virtually to substitute for in-person clinic visit. I was at home while conducting this encounter, patient located at their home or alternate location of their choice. Jameel Whitehead, was evaluated through a synchronous (real-time) audio-video encounter. The patient (or guardian if applicable) is aware that this is a billable service, which includes applicable co-pays. This Virtual Visit was conducted with patient's (and/or legal guardian's) consent. The visit was conducted pursuant to the emergency declaration under the [address] [place] [address] authority and the [employer] Act. Patient identification was verified, and a caregiver was present when appropriate. The patient was located at: Home: [address] [facility]  [address] [address]  The provider was located at: Home: [unfilled]      --Rosa Panchal NP on 1/4/2023 at 11:08 AM    An electronic signature was used to authenticate this note.

## 2023-01-04 ENCOUNTER — VIRTUAL VISIT (OUTPATIENT)
Dept: SLEEP MEDICINE | Age: 48
End: 2023-01-04
Payer: COMMERCIAL

## 2023-01-04 ENCOUNTER — PATIENT MESSAGE (OUTPATIENT)
Dept: SLEEP MEDICINE | Age: 48
End: 2023-01-04

## 2023-01-04 DIAGNOSIS — I10 PRIMARY HYPERTENSION: ICD-10-CM

## 2023-01-04 DIAGNOSIS — E66.2 OBESITY HYPOVENTILATION SYNDROME (HCC): ICD-10-CM

## 2023-01-04 DIAGNOSIS — G47.33 OSA (OBSTRUCTIVE SLEEP APNEA): Primary | ICD-10-CM

## 2023-01-04 DIAGNOSIS — G45.9 TIA (TRANSIENT ISCHEMIC ATTACK): ICD-10-CM

## 2023-01-04 DIAGNOSIS — I42.9 CARDIOMYOPATHY, UNSPECIFIED TYPE (HCC): ICD-10-CM

## 2023-01-04 PROCEDURE — 99214 OFFICE O/P EST MOD 30 MIN: CPT | Performed by: NURSE PRACTITIONER

## 2023-01-04 NOTE — PATIENT INSTRUCTIONS
217 [facility] [place]. [place], [address]  Tel.  [phone]  Fax. 2143 [facility]  [place], [address] [facility]  Tel.  [phone]  Fax. [phone] [address]  Tel.  [phone]  Fax. [phone]     Learning About CPAP for Sleep Apnea  What is CPAP? CPAP is a small machine that you use at home every night while you sleep. It increases air pressure in your throat to keep your airway open. When you have sleep apnea, this can help you sleep better so you feel much better. CPAP stands for \"continuous positive airway pressure. \"  The CPAP machine will have one of the following:  A mask that covers your nose and mouth  Prongs that fit into your nose  A mask that covers your nose only, the most common type. This type is called NCPAP. The N stands for \"nasal.\"  Why is it done? CPAP is usually the best treatment for obstructive sleep apnea. It is the first treatment choice and the most widely used. Your doctor may suggest CPAP if you have: Moderate to severe sleep apnea. Sleep apnea and coronary artery disease (CAD) or heart failure. How does it help? CPAP can help you have more normal sleep, so you feel less sleepy and more alert during the daytime. CPAP may help keep heart failure or other heart problems from getting worse. NCPAP may help lower your blood pressure. If you use CPAP, your bed partner may also sleep better because you are not snoring or restless. What are the side effects? Some people who use CPAP have:  A dry or stuffy nose and a sore throat. Irritated skin on the face. Sore eyes. Bloating. If you have any of these problems, work with your doctor to fix them. Here are some things you can try:  Be sure the mask or nasal prongs fit well. See if your doctor can adjust the pressure of your CPAP. If your nose is dry, try a humidifier.   If your nose is runny or stuffy, try decongestant medicine or a steroid nasal spray. If these things do not help, you might try a different type of machine. Some machines have air pressure that adjusts on its own. Others have air pressures that are different when you breathe in than when you breathe out. This may reduce discomfort caused by too much pressure in your nose. Where can you learn more? Go to [employer].be  Enter Curt Kim in the search box to learn more about \"Learning About CPAP for Sleep Apnea. \"   © 3174-2968 Healthwise, Incorporated. Care instructions adapted under license by [facility] (which disclaims liability or warranty for this information). This care instruction is for use with your licensed healthcare professional. If you have questions about a medical condition or this instruction, always ask your healthcare professional. [address] any warranty or liability for your use of this information. Content Version: 5.1.11746; Last Revised: January 11, 2010  PROPER SLEEP HYGIENE    What to avoid  Do not have drinks with caffeine, such as coffee or black tea, for 8 hours before bed. Do not smoke or use other types of tobacco near bedtime. Nicotine is a stimulant and can keep you awake. Avoid drinking alcohol late in the evening, because it can cause you to wake in the middle of the night. Do not eat a big meal close to bedtime. If you are hungry, eat a light snack. Do not drink a lot of water close to bedtime, because the need to urinate may wake you up during the night. Do not read or watch TV in bed. Use the bed only for sleeping and sexual activity. What to try  Go to bed at the same time every night, and wake up at the same time every morning. Do not take naps during the day. Keep your bedroom quiet, dark, and cool. Get regular exercise, but not within 3 to 4 hours of your bedtime. .  Sleep on a comfortable pillow and mattress.   If watching the clock makes you anxious, turn it facing away from you so you cannot see the time. If you worry when you lie down, start a worry book. Well before bedtime, write down your worries, and then set the book and your concerns aside. Try meditation or other relaxation techniques before you go to bed. If you cannot fall asleep, get up and go to another room until you feel sleepy. Do something relaxing. Repeat your bedtime routine before you go to bed again. Make your house quiet and calm about an hour before bedtime. Turn down the lights, turn off the TV, log off the computer, and turn down the volume on music. This can help you relax after a busy day. Drowsy Driving: The [employer] cites drowsiness as a causing factor in more than 336,991 police reported crashes annually, resulting in 76,000 injuries and 1,500 deaths. Other surveys suggest 55% of people polled have driven while drowsy in the past year, 23% had fallen asleep but not crashed, 3% crashed, and 2% had and accident due to drowsy driving. Who is at risk? Young Drivers: One study of drowsy driving accidents states that 55% of the drivers were under 25 years. Of those, 75% were male. Shift Workers and Travelers: People who work overnight or travel across time zones frequently are at higher risk of experiencing Circadian Rhythm Disorders. They are trying to work and function when their body is programed to sleep. Sleep Deprived: Lack of sleep has a serious impact on your ability to pay attention or focus on a task. Consistently getting less than the average of 8 hours your body needs creates partial or cumulative sleep deprivation. Untreated Sleep Disorders: Sleep Apnea, Narcolepsy, R.L.S., and other sleep disorders (untreated) prevent a person from getting enough restful sleep. This leads to excessive daytime sleepiness and increases the risk for drowsy driving accidents by up to 7 times.   Medications / Alcohol: Even over the counter medications can cause drowsiness. Medications that impair a drivers attention should have a warning label. Alcohol naturally makes you sleepy and on its own can cause accidents. Combined with excessive drowsiness its effects are amplified. Signs of Drowsy Driving:   * You don't remember driving the last few miles   * You may drift out of your lisa   * You are unable to focus and your thoughts wander   * You may yawn more often than normal   * You have difficulty keeping your eyes open / nodding off   * Missing traffic signs, speeding, or tailgating  Prevention-   Good sleep hygiene, lifestyle and behavioral choices have the most impact on drowsy driving. There is no substitute for sleep and the average person requires 8 hours nightly. If you find yourself driving drowsy, stop and sleep. Consider the sleep hygiene tips provided during your visit as well. Medication Refill Policy: Refills for all medications require 1 week advance notice. Please have your pharmacy fax a refill request. We are unable to fax, or call in \"controled substance\" medications and you will need to pick these prescriptions up from our office. [employer] Activation    Thank you for requesting access to [employer]. Please follow the instructions below to securely access and download your online medical record. [employer] allows you to send messages to your doctor, view your test results, renew your prescriptions, schedule appointments, and more. How Do I Sign Up? In your internet browser, go to [email or web address]. Click on the First Time User? Click Here link in the Sign In box. You will see the New Member Sign Up page. Enter your [employer] Access Code exactly as it appears below. You will not need to use this code after youve completed the sign-up process. If you do not sign up before the expiration date, you must request a new code. [employer] Access Code:  Activation code not generated  Current [employer] Status: Active (This is the date your [employer] access code will )    Enter the last four digits of your Social Security Number (xxxx) and Date of Birth (mm/dd/yyyy) as indicated and click Submit. You will be taken to the next sign-up page. Create a [employer] ID. This will be your [employer] login ID and cannot be changed, so think of one that is secure and easy to remember. Create a [employer] password. You can change your password at any time. Enter your Password Reset Question and Answer. This can be used at a later time if you forget your password. Enter your e-mail address. You will receive e-mail notification when new information is available in [address] Click Sign Up. You can now view and download portions of your medical record. Click the Collect[employer]it link to download a portable copy of your medical information. Additional Information    If you have questions, please call [phone]. Remember, [employer] is NOT to be used for urgent needs. For medical emergencies, dial 911.

## 2023-01-04 NOTE — TELEPHONE ENCOUNTER
Anjel recall registry information sent to patient via [address]      Nadine Diggs, KARL-BC, [facility]   Nurse Practitioner  [facility] [address] [facility]

## 2023-01-11 ENCOUNTER — HOSPITAL ENCOUNTER (OUTPATIENT)
Dept: SLEEP MEDICINE | Age: 48
Discharge: HOME | End: 2023-01-11
Attending: NURSE PRACTITIONER
Payer: COMMERCIAL

## 2023-01-11 VITALS — TEMPERATURE: 97.3 F | HEIGHT: 74 IN | WEIGHT: 315 LBS | BODY MASS INDEX: 40.43 KG/M2

## 2023-01-11 DIAGNOSIS — G45.9 TIA (TRANSIENT ISCHEMIC ATTACK): ICD-10-CM

## 2023-01-11 DIAGNOSIS — G47.33 OSA (OBSTRUCTIVE SLEEP APNEA): ICD-10-CM

## 2023-01-11 DIAGNOSIS — I42.9 CARDIOMYOPATHY, UNSPECIFIED TYPE (HCC): ICD-10-CM

## 2023-01-11 DIAGNOSIS — E66.2 OBESITY HYPOVENTILATION SYNDROME (HCC): ICD-10-CM

## 2023-01-11 PROCEDURE — 95810 POLYSOM 6/> YRS 4/> PARAM: CPT | Performed by: INTERNAL MEDICINE

## 2023-01-12 ENCOUNTER — DOCUMENTATION ONLY (OUTPATIENT)
Dept: SLEEP MEDICINE | Age: 48
End: 2023-01-12

## 2023-01-12 NOTE — PROGRESS NOTES
217 [facility] [place]. [place], [address]  Tel.  [phone]  Fax. 100 [facility]  [place], [address] [facility]  Tel.  [phone]  Fax. [phone] [address] [place]  Tel.  [phone]  Fax. [phone]     Sleep Study Technical Notes        PRE-Test:  Xander Abdi (: 1975) arrived in the lobby. Patient was greeted and screening questions asked. The patient was taken to the Sleep Center and taken directly to his/her room. Vitals:  Temperature (97.3)   SaO2 (92)   Weight per patient (330)  Procedure explained to the patient and questions were answered. The patient expressed understanding of the procedure. Electrodes were applied without incident. The patient was placed in bed and the study was started. Acquisition Notes:  Lights off: 10:01pm    Respiratory events: hypops mostly in REM  ECG:  NSR  Snoring:  mild-moderate   Other comments: used pillows in the past but didn't work because pt states he is an oral breather. If pt is to come back for pap use a FFM. Patient to bathroom 0 times      POST Test:  Patient was awakened. Electrodes were removed. The patient was discharged after answering the Post Questionnaire. Patient stated that he was alert and ok to drive. Equipment and room cleaned per infection control policy.

## 2023-01-18 DIAGNOSIS — E66.2 OBESITY HYPOVENTILATION SYNDROME (HCC): ICD-10-CM

## 2023-01-18 DIAGNOSIS — G47.33 OSA (OBSTRUCTIVE SLEEP APNEA): Primary | ICD-10-CM

## 2023-01-18 NOTE — PROGRESS NOTES
Titration study ordered. Orders Placed This Encounter    [id number] POLYSOMNOGRAPHY CPAP TITRATION     Standing Status:   Future     Standing Expiration Date:   1/18/2024     Scheduling Instructions:      Route to Dr. Abe Hirsch.      Order Specific Question:   Reason for Exam     Answer:   BRIE/OHS       SHENA Hamm, [facility]   Nurse Practitioner  [facility] [address]

## 2023-02-15 ENCOUNTER — HOSPITAL ENCOUNTER (OUTPATIENT)
Dept: SLEEP MEDICINE | Age: 48
Discharge: HOME | End: 2023-02-15
Attending: NURSE PRACTITIONER
Payer: COMMERCIAL

## 2023-02-15 VITALS
HEART RATE: 95 BPM | HEIGHT: 74 IN | TEMPERATURE: 98.4 F | OXYGEN SATURATION: 91 % | BODY MASS INDEX: 40.43 KG/M2 | SYSTOLIC BLOOD PRESSURE: 147 MMHG | DIASTOLIC BLOOD PRESSURE: 93 MMHG | WEIGHT: 315 LBS

## 2023-02-15 DIAGNOSIS — G47.33 OSA (OBSTRUCTIVE SLEEP APNEA): ICD-10-CM

## 2023-02-15 DIAGNOSIS — E66.2 OBESITY HYPOVENTILATION SYNDROME (HCC): ICD-10-CM

## 2023-02-15 PROCEDURE — 95811 POLYSOM 6/>YRS CPAP 4/> PARM: CPT | Performed by: INTERNAL MEDICINE

## 2023-02-16 ENCOUNTER — DOCUMENTATION ONLY (OUTPATIENT)
Dept: SLEEP MEDICINE | Age: 48
End: 2023-02-16

## 2023-02-16 NOTE — PROGRESS NOTES
[address] [facility] [address] [place]. [place], [address]  Tel.  [phone]  Fax. [address] [facility] [address]  [place], [address] [facility]  Tel.  [phone]  Fax. [phone] [address] [facility] Lisa Gipson   Tel.  [phone]  Fax. [phone]     Sleep Study Technical Notes        PRE-Test:  Chiqui Prather (: 1975) arrived in the lobby. Patient was greeted and screening questions asked. The patient was taken to the Sleep Center and taken directly to his/her room. Vitals:  Temperature (98.4)   BP (147/93)   SaO2 (91)   Weight per patient (330)  Procedure explained to the patient and questions were answered. The patient expressed understanding of the procedure. Electrodes were applied without incident. The patient was placed in bed and the study was started. Acquisition Notes:  Lights off: 10:04pm    Respiratory events: hypops  ECG:  NSR  Snoring: mild   PAP titration: CPAP 14   Mask(s) Used: F[employer]P [employer] FFM size large  Other comments: Patient does have facial hair which could have contributed to leaks  Patient to bathroom 0 times      POST Test:  Patient was awakened. Electrodes were removed. The patient was discharged after answering the Post Questionnaire. Patient stated that he was alert and ok to drive. Equipment and room cleaned per infection control policy.

## 2023-02-20 ENCOUNTER — DOCUMENTATION ONLY (OUTPATIENT)
Dept: SLEEP MEDICINE | Age: 48
End: 2023-02-20

## 2023-05-25 RX ORDER — ASPIRIN 81 MG/1
81 TABLET, CHEWABLE ORAL DAILY
COMMUNITY
Start: 2022-12-20

## 2023-05-25 RX ORDER — METOPROLOL SUCCINATE 25 MG/1
25 TABLET, EXTENDED RELEASE ORAL DAILY
COMMUNITY

## 2023-05-25 RX ORDER — LISINOPRIL 5 MG/1
5 TABLET ORAL DAILY
COMMUNITY

## 2023-05-25 RX ORDER — ATORVASTATIN CALCIUM 40 MG/1
1 TABLET, FILM COATED ORAL NIGHTLY
COMMUNITY
Start: 2022-12-19

## 2023-05-25 RX ORDER — CLOPIDOGREL BISULFATE 75 MG/1
75 TABLET ORAL DAILY
COMMUNITY
Start: 2022-12-19